# Patient Record
Sex: MALE | Race: BLACK OR AFRICAN AMERICAN | NOT HISPANIC OR LATINO | Employment: FULL TIME | URBAN - METROPOLITAN AREA
[De-identification: names, ages, dates, MRNs, and addresses within clinical notes are randomized per-mention and may not be internally consistent; named-entity substitution may affect disease eponyms.]

---

## 2017-01-09 ENCOUNTER — ANESTHESIA EVENT (OUTPATIENT)
Dept: SURGERY | Facility: HOSPITAL | Age: 47
End: 2017-01-09
Payer: COMMERCIAL

## 2017-01-09 ENCOUNTER — TELEPHONE (OUTPATIENT)
Dept: SURGERY | Facility: CLINIC | Age: 47
End: 2017-01-09

## 2017-01-09 NOTE — ANESTHESIA PREPROCEDURE EVALUATION
01/09/2017  Pre-operative evaluation for Procedure(s) (LRB):  REPAIR-HERNIA-INGUINAL Open Right with Mesh (Right)    Marina Caban is a 46 y.o. male     LDA:      Prev airway: None documented    There is no problem list on file for this patient.      Review of patient's allergies indicates:  No Known Allergies     No current facility-administered medications on file prior to encounter.      No current outpatient prescriptions on file prior to encounter.       Past Surgical History   Procedure Laterality Date    Hernia rpair  2003       Social History     Social History    Marital status:      Spouse name: N/A    Number of children: N/A    Years of education: N/A     Occupational History    Not on file.     Social History Main Topics    Smoking status: Never Smoker    Smokeless tobacco: Not on file    Alcohol use Not on file    Drug use: Not on file    Sexual activity: Not on file     Other Topics Concern    Not on file     Social History Narrative         Vital Signs Range (Last 24H):         CBC: No results for input(s): WBC, RBC, HGB, HCT, PLT, MCV, MCH, MCHC in the last 72 hours.    CMP: No results for input(s): NA, K, CL, CO2, BUN, CREATININE, GLU, MG, PHOS, CALCIUM, ALBUMIN, PROT, ALKPHOS, ALT, AST, BILITOT in the last 72 hours.    INR  No results for input(s): INR, PROTIME, APTT in the last 72 hours.    Invalid input(s): PT      [facility] Anesthesia Evaluation    I have reviewed the Patient Summary Reports.     I have reviewed the Medications.     Review of Systems  Anesthesia Hx:  No problems with previous Anesthesia Denies Hx of Anesthetic complications  History of prior surgery of interest to airway management or planning: Denies Family Hx of Anesthesia complications.   Denies Personal Hx of Anesthesia complications.   Social:  Non-Smoker, No Alcohol Use     Hematology/Oncology:  Hematology Normal        EENT/Dental:EENT/Dental Normal   Cardiovascular:  Cardiovascular Normal     Pulmonary:  Pulmonary Normal    Renal/:  Renal/ Normal     Hepatic/GI:   H/o R inguinal hernia   Musculoskeletal:  Musculoskeletal Normal    Neurological:  Neurology Normal    Endocrine:  Endocrine Normal        Physical Exam  General:  Well nourished    Airway/Jaw/Neck:  Airway Findings: Mouth Opening: Normal Tongue: Normal  General Airway Assessment: Adult  Mallampati: I  Improves to I with phonation.  TM Distance: Normal, at least 6 cm  Jaw/Neck Findings:  Neck ROM: Normal ROM     Eyes/Ears/Nose:  EYES/EARS/NOSE FINDINGS: Normal   Dental:  Dental Findings: In tact   Chest/Lungs:  Chest/Lungs Findings: Clear to auscultation, Normal Respiratory Rate     Heart/Vascular:  Heart Findings: Rate: Normal  Rhythm: Regular Rhythm        Mental Status:  Mental Status Findings:  Cooperative, Alert and Oriented         Anesthesia Plan  Type of Anesthesia, risks & benefits discussed:  Anesthesia Type:  general  Patient's Preference:   Intra-op Monitoring Plan:   Intra-op Monitoring Plan Comments:   Post Op Pain Control Plan:   Post Op Pain Control Plan Comments:   Induction:   IV  Beta Blocker:  Patient is not currently on a Beta-Blocker (No further documentation required).       Informed Consent: Patient understands risks and agrees with Anesthesia plan.  Questions answered. Anesthesia consent signed with patient.  ASA Score: 1     Day of Surgery Review of History & Physical: I have interviewed and examined the patient. I have reviewed the patient's H&P dated:    H&P update referred to the surgeon.         Ready For Surgery From Anesthesia Perspective.

## 2017-01-09 NOTE — TELEPHONE ENCOUNTER
Left message on patient's voicemail for him to arrive at [facility] tomorrow 1/10/17 at 8:30am for surgery with Dr. Perez.  Pre-Op instructions reinforced.

## 2017-01-10 ENCOUNTER — ANESTHESIA (OUTPATIENT)
Dept: SURGERY | Facility: HOSPITAL | Age: 47
End: 2017-01-10
Payer: COMMERCIAL

## 2017-01-10 PROBLEM — K40.90 RIGHT INGUINAL HERNIA: Status: ACTIVE | Noted: 2017-01-10

## 2017-01-10 PROCEDURE — 25000003 PHARM REV CODE 250: Performed by: SURGERY

## 2017-01-10 PROCEDURE — 25000003 PHARM REV CODE 250: Performed by: STUDENT IN AN ORGANIZED HEALTH CARE EDUCATION/TRAINING PROGRAM

## 2017-01-10 PROCEDURE — D9220A PRA ANESTHESIA: Mod: ,,, | Performed by: ANESTHESIOLOGY

## 2017-01-10 PROCEDURE — 63600175 PHARM REV CODE 636 W HCPCS: Performed by: STUDENT IN AN ORGANIZED HEALTH CARE EDUCATION/TRAINING PROGRAM

## 2017-01-10 RX ORDER — PROPOFOL 10 MG/ML
VIAL (ML) INTRAVENOUS
Status: DISCONTINUED | OUTPATIENT
Start: 2017-01-10 | End: 2017-01-10

## 2017-01-10 RX ORDER — ACETAMINOPHEN 10 MG/ML
INJECTION, SOLUTION INTRAVENOUS
Status: DISCONTINUED | OUTPATIENT
Start: 2017-01-10 | End: 2017-01-10

## 2017-01-10 RX ORDER — ONDANSETRON 2 MG/ML
INJECTION INTRAMUSCULAR; INTRAVENOUS
Status: DISCONTINUED | OUTPATIENT
Start: 2017-01-10 | End: 2017-01-10

## 2017-01-10 RX ORDER — SUCCINYLCHOLINE CHLORIDE 20 MG/ML
INJECTION INTRAMUSCULAR; INTRAVENOUS
Status: DISCONTINUED | OUTPATIENT
Start: 2017-01-10 | End: 2017-01-10

## 2017-01-10 RX ORDER — MIDAZOLAM HYDROCHLORIDE 1 MG/ML
INJECTION, SOLUTION INTRAMUSCULAR; INTRAVENOUS
Status: DISCONTINUED | OUTPATIENT
Start: 2017-01-10 | End: 2017-01-10

## 2017-01-10 RX ORDER — KETOROLAC TROMETHAMINE 30 MG/ML
INJECTION, SOLUTION INTRAMUSCULAR; INTRAVENOUS
Status: DISCONTINUED | OUTPATIENT
Start: 2017-01-10 | End: 2017-01-10

## 2017-01-10 RX ORDER — FENTANYL CITRATE 50 UG/ML
INJECTION, SOLUTION INTRAMUSCULAR; INTRAVENOUS
Status: DISCONTINUED | OUTPATIENT
Start: 2017-01-10 | End: 2017-01-10

## 2017-01-10 RX ORDER — LIDOCAINE HCL/PF 100 MG/5ML
SYRINGE (ML) INTRAVENOUS
Status: DISCONTINUED | OUTPATIENT
Start: 2017-01-10 | End: 2017-01-10

## 2017-01-10 RX ADMIN — SUCCINYLCHOLINE CHLORIDE 180 MG: 20 INJECTION, SOLUTION INTRAMUSCULAR; INTRAVENOUS at 09:01

## 2017-01-10 RX ADMIN — ONDANSETRON 4 MG: 2 INJECTION INTRAMUSCULAR; INTRAVENOUS at 10:01

## 2017-01-10 RX ADMIN — LIDOCAINE HYDROCHLORIDE 70 MG: 20 INJECTION, SOLUTION INTRAVENOUS at 09:01

## 2017-01-10 RX ADMIN — ACETAMINOPHEN 1000 MG: 10 INJECTION, SOLUTION INTRAVENOUS at 10:01

## 2017-01-10 RX ADMIN — KETOROLAC TROMETHAMINE 30 MG: 30 INJECTION, SOLUTION INTRAMUSCULAR; INTRAVENOUS at 10:01

## 2017-01-10 RX ADMIN — MIDAZOLAM HYDROCHLORIDE 2 MG: 1 INJECTION, SOLUTION INTRAMUSCULAR; INTRAVENOUS at 09:01

## 2017-01-10 RX ADMIN — SODIUM CHLORIDE: 0.9 INJECTION, SOLUTION INTRAVENOUS at 09:01

## 2017-01-10 RX ADMIN — FENTANYL CITRATE 100 MCG: 50 INJECTION, SOLUTION INTRAMUSCULAR; INTRAVENOUS at 09:01

## 2017-01-10 RX ADMIN — PROPOFOL 150 MG: 10 INJECTION, EMULSION INTRAVENOUS at 09:01

## 2017-01-10 RX ADMIN — Medication 2 G: at 10:01

## 2017-01-10 NOTE — TRANSFER OF CARE
Anesthesia Transfer of Care Note    Patient: Marina Caban    Procedure(s) Performed: Procedure(s) (LRB):  REPAIR-HERNIA-INGUINAL Open Right with Mesh (Right)    Patient location: PACU    Anesthesia Type: general    Transport from OR: Transported from OR on 2-3 L/min O2 by NC with adequate spontaneous ventilation    Post pain: adequate analgesia    Post assessment: no apparent anesthetic complications    Post vital signs: stable    Level of consciousness: awake and alert    Nausea/Vomiting: no nausea/vomiting    Complications: none          Last vitals:   Visit Vitals    BP (!) 157/86 (BP Location: Left arm, Patient Position: Lying, BP Method: Automatic)    Pulse 83    Temp 36.1 °C (96.9 °F) (Axillary)    Resp 16    Ht 6' (1.829 m)    Wt 80.7 kg (178 lb)    SpO2 98%    BMI 24.14 kg/m2

## 2017-01-10 NOTE — ANESTHESIA POSTPROCEDURE EVALUATION
Anesthesia Post Evaluation    Patient: Marina Caban    Procedure(s) Performed: Procedure(s) (LRB):  REPAIR-HERNIA-INGUINAL Open Right with Mesh (Right)    Final Anesthesia Type: general  Patient location during evaluation: PACU  Patient participation: Yes- Able to Participate  Level of consciousness: awake and alert  Post-procedure vital signs: reviewed and stable  Pain management: adequate  Airway patency: patent  PONV status at discharge: No PONV  Anesthetic complications: no      Cardiovascular status: blood pressure returned to baseline  Respiratory status: unassisted  Hydration status: euvolemic  Follow-up not needed.        Visit Vitals    /76    Pulse 66    Temp 36.7 °C (98 °F) (Oral)    Resp 19    Ht 6' (1.829 m)    Wt 80.7 kg (178 lb)    SpO2 100%    BMI 24.14 kg/m2       Pain/Nj Score: Pain Assessment Performed: Yes (1/10/2017 12:21 PM)  Presence of Pain: complains of pain/discomfort (1/10/2017 12:21 PM)  Pain Rating Prior to Med Admin: 9 (1/10/2017 11:41 AM)  Pain Rating Post Med Admin: 6 (1/10/2017 11:45 AM)  Nj Score: 10 (1/10/2017 12:21 PM)

## 2017-01-10 NOTE — ANESTHESIA RELEASE NOTE
Anesthesia Release from PACU Note    Patient: Marina Caban    Procedure(s) Performed: Procedure(s) (LRB):  REPAIR-HERNIA-INGUINAL Open Right with Mesh (Right)    Anesthesia type: general    Post pain: Adequate analgesia    Post assessment: no apparent anesthetic complications    Last Vitals:   Visit Vitals    /76    Pulse 66    Temp 36.7 °C (98 °F) (Oral)    Resp 19    Ht 6' (1.829 m)    Wt 80.7 kg (178 lb)    SpO2 100%    BMI 24.14 kg/m2       Post vital signs: stable    Level of consciousness: awake, alert  and oriented    Nausea/Vomiting: no nausea/no vomiting    Complications: none    Airway Patency: patent    Respiratory: unassisted    Cardiovascular: stable and blood pressure at baseline    Hydration: euvolemic

## 2017-01-23 ENCOUNTER — OFFICE VISIT (OUTPATIENT)
Dept: SURGERY | Facility: CLINIC | Age: 47
End: 2017-01-23
Payer: COMMERCIAL

## 2017-01-23 VITALS
BODY MASS INDEX: 25.06 KG/M2 | WEIGHT: 185 LBS | TEMPERATURE: 98 F | HEART RATE: 85 BPM | SYSTOLIC BLOOD PRESSURE: 133 MMHG | HEIGHT: 72 IN | DIASTOLIC BLOOD PRESSURE: 74 MMHG

## 2017-01-23 DIAGNOSIS — Z98.890 S/P INGUINAL HERNIA REPAIR USING SYNTHETIC PATCH: Primary | ICD-10-CM

## 2017-01-23 DIAGNOSIS — Z87.19 S/P INGUINAL HERNIA REPAIR USING SYNTHETIC PATCH: Primary | ICD-10-CM

## 2017-01-23 PROCEDURE — 99999 PR PBB SHADOW E&M-EST. PATIENT-LVL III: CPT | Mod: PBBFAC,,, | Performed by: SURGERY

## 2017-01-23 PROCEDURE — 99024 POSTOP FOLLOW-UP VISIT: CPT | Mod: S$GLB,,, | Performed by: SURGERY

## 2017-01-23 NOTE — PROGRESS NOTES
General Surgery Progress Note    S:  47 y/o male s/p open right inguinal hernia repair with mesh.  Pt has been doing well. No fevers / chills.  No CP / SOB.  dean diet.  Had some constipation due to narcotics, since relieved.      O:    Visit Vitals    /74 (BP Location: Right arm, Patient Position: Sitting, BP Method: Automatic)    Pulse 85    Temp 98.3 °F (36.8 °C) (Oral)    Ht 6' (1.829 m)    Wt 83.9 kg (185 lb)    BMI 25.09 kg/m2     NAD, A&O x4  Abd soft.  Right inguinal incision well healed; healing ridge noted; no erythema / exudate.  No recurrence noted    Path:  Hernia sac    A/P:  47 y/o male s/p open RIH repair with mesh    Light duty 4 more weeks.  RTC PRN.    Silvana Giordano MD  General Surgery, PGY-V  [phone]

## 2020-03-16 ENCOUNTER — OFFICE VISIT (OUTPATIENT)
Dept: URGENT CARE | Facility: CLINIC | Age: 50
End: 2020-03-16
Payer: COMMERCIAL

## 2020-03-16 VITALS
TEMPERATURE: 98 F | HEIGHT: 72 IN | WEIGHT: 190 LBS | DIASTOLIC BLOOD PRESSURE: 76 MMHG | BODY MASS INDEX: 25.73 KG/M2 | OXYGEN SATURATION: 97 % | HEART RATE: 79 BPM | SYSTOLIC BLOOD PRESSURE: 129 MMHG

## 2020-03-16 DIAGNOSIS — R05.9 COUGH: ICD-10-CM

## 2020-03-16 DIAGNOSIS — R19.7 DIARRHEA, UNSPECIFIED TYPE: ICD-10-CM

## 2020-03-16 DIAGNOSIS — K52.9 GASTROENTERITIS: Primary | ICD-10-CM

## 2020-03-16 DIAGNOSIS — R11.0 NAUSEA: ICD-10-CM

## 2020-03-16 LAB
CTP QC/QA: YES
FLUAV AG NPH QL: NEGATIVE
FLUBV AG NPH QL: NEGATIVE

## 2020-03-16 PROCEDURE — 99214 OFFICE O/P EST MOD 30 MIN: CPT | Mod: 25,S$GLB,, | Performed by: FAMILY MEDICINE

## 2020-03-16 PROCEDURE — 87804 POCT INFLUENZA A/B: ICD-10-PCS | Mod: QW,S$GLB,, | Performed by: FAMILY MEDICINE

## 2020-03-16 PROCEDURE — 87804 INFLUENZA ASSAY W/OPTIC: CPT | Mod: QW,S$GLB,, | Performed by: FAMILY MEDICINE

## 2020-03-16 PROCEDURE — 99214 PR OFFICE/OUTPT VISIT, EST, LEVL IV, 30-39 MIN: ICD-10-PCS | Mod: 25,S$GLB,, | Performed by: FAMILY MEDICINE

## 2020-03-16 RX ORDER — ONDANSETRON 4 MG/1
4 TABLET, ORALLY DISINTEGRATING ORAL EVERY 8 HOURS PRN
Qty: 20 TABLET | Refills: 0 | Status: SHIPPED | OUTPATIENT
Start: 2020-03-16 | End: 2020-03-23

## 2020-03-16 RX ORDER — DICYCLOMINE HYDROCHLORIDE 20 MG/1
20 TABLET ORAL EVERY 6 HOURS
Qty: 20 TABLET | Refills: 0 | Status: ON HOLD | OUTPATIENT
Start: 2020-03-16 | End: 2020-03-22 | Stop reason: HOSPADM

## 2020-03-16 NOTE — PROGRESS NOTES
Subjective:       Patient ID: Marina Caban is a 50 y.o. male.    Vitals:  height is 6' (1.829 m) and weight is 86.2 kg (190 lb). His oral temperature is 98.1 °F (36.7 °C). His blood pressure is 129/76 and his pulse is 79. His oxygen saturation is 97%.     Chief Complaint: Cough    Cough   This is a new problem. The current episode started in the past 7 days (4 or 5 days). The problem has been gradually worsening. The cough is non-productive. Associated symptoms include headaches. Pertinent negatives include no chest pain, chills, ear congestion, ear pain, eye redness, fever, heartburn, hemoptysis, myalgias, nasal congestion, postnasal drip, rash, rhinorrhea, sore throat, shortness of breath, sweats, weight loss or wheezing. Exacerbated by: inhale. Treatments tried: Theraflu. The treatment provided mild relief. There is no history of asthma, bronchiectasis, bronchitis, COPD, emphysema, environmental allergies or pneumonia.   pt also c/o having nausea and diarrhea for a few days.  Pt has about 3 episodes of diarrhea a day.    Constitution: Positive for appetite change and fatigue. Negative for chills, sweating and fever.   HENT: Negative for ear pain, postnasal drip, sinus pressure, sore throat and voice change.    Neck: Negative for painful lymph nodes.   Cardiovascular: Negative for chest pain.   Eyes: Negative for eye redness.   Respiratory: Positive for cough. Negative for chest tightness, sputum production, bloody sputum, COPD, shortness of breath, stridor, wheezing and asthma.    Gastrointestinal: Negative for heartburn.   Musculoskeletal: Negative for muscle ache.   Skin: Negative for rash.   Allergic/Immunologic: Negative for environmental allergies, seasonal allergies and asthma.   Neurological: Positive for headaches.   Hematologic/Lymphatic: Negative for swollen lymph nodes.       Objective:      Physical Exam   Constitutional: He is oriented to person, place, and time. He appears well-developed and  well-nourished. He is cooperative.  Non-toxic appearance. He does not appear ill. No distress.   HENT:   Head: Normocephalic and atraumatic.   Right Ear: Hearing, tympanic membrane, external ear and ear canal normal.   Left Ear: Hearing, tympanic membrane, external ear and ear canal normal.   Nose: Nose normal. No mucosal edema, rhinorrhea or nasal deformity. No epistaxis. Right sinus exhibits no maxillary sinus tenderness and no frontal sinus tenderness. Left sinus exhibits no maxillary sinus tenderness and no frontal sinus tenderness.   Mouth/Throat: Uvula is midline, oropharynx is clear and moist and mucous membranes are normal. No trismus in the jaw. Normal dentition. No uvula swelling. No posterior oropharyngeal erythema.   Eyes: Conjunctivae and lids are normal. Right eye exhibits no discharge. Left eye exhibits no discharge. No scleral icterus.   Neck: Trachea normal, normal range of motion, full passive range of motion without pain and phonation normal. Neck supple.   Cardiovascular: Normal rate, regular rhythm, normal heart sounds, intact distal pulses and normal pulses.   Pulmonary/Chest: Effort normal and breath sounds normal. No respiratory distress. He has no wheezes.   Abdominal: Soft. Normal appearance and bowel sounds are normal. He exhibits no distension and no pulsatile midline mass. There is no tenderness. There is no rebound and no guarding.   Musculoskeletal: Normal range of motion. He exhibits no edema or deformity.   Lymphadenopathy:     He has no cervical adenopathy.   Neurological: He is alert and oriented to person, place, and time. He exhibits normal muscle tone. Coordination normal.   Skin: Skin is warm, dry, intact, not diaphoretic and not pale.   Psychiatric: He has a normal mood and affect. His speech is normal and behavior is normal. Judgment and thought content normal. Cognition and memory are normal.   Nursing note and vitals reviewed.        Assessment:       1. Gastroenteritis     2. Cough    3. Diarrhea, unspecified type    4. Nausea        Plan:         Gastroenteritis    Cough  -     POCT Influenza A/B    Diarrhea, unspecified type  -     dicyclomine (BENTYL) 20 mg tablet; Take 1 tablet (20 mg total) by mouth every 6 (six) hours. for 5 days  Dispense: 20 tablet; Refill: 0    Nausea  -     ondansetron (ZOFRAN-ODT) 4 MG TbDL; Take 1 tablet (4 mg total) by mouth every 8 (eight) hours as needed (nausea/vomit).  Dispense: 20 tablet; Refill: 0          Patient Instructions     Uncertain Causes of Diarrhea (Adult)    Diarrhea is when stools are loose and watery. This can be caused by:  · Viral infections  · Bacterial infections  · Food poisoning  · Parasites  · Irritable bowel syndrome (IBS)  · Inflammatory bowel diseases such as ulcerative colitis, Crohn's disease, and celiac disease  · Food intolerance, such as to lactose, the sugar found in milk and milk products  · Reaction to medicines like antibiotics, laxatives, cancer drugs, and antacids  Along with diarrhea, you may also have:  · Abdominal pain and cramping  · Nausea and vomiting  · Loss of bowel control  · Fever and chills  · Bloody stools  In some cases, antibiotics may help to treat diarrhea. You may have a stool sample test. This is done to see what is causing your diarrhea, and if antibiotics will help treat it. The results of a stool sample test may take up to 2 days. The healthcare provider may not give you antibiotics until he or she has the stool test results.  Diarrhea can cause dehydration. This is the loss of too much water and other fluids from the body. When this occurs, body fluid must be replaced. This can be done with oral rehydration solutions. Oral rehydration solutions are available at drugstores and grocery stores without a prescription.  Home care  Follow all instructions given by your healthcare provider. Rest at home for the next 24 hours, or until you feel better. Avoid caffeine, tobacco, and alcohol. These can  make diarrhea, cramping, and pain worse.  If taking medicines:  · Dont take over-the-counter diarrhea or nausea medicines unless your healthcare provider tells you to.  · You may use acetaminophen or NSAID medicines like ibuprofen or naproxen to reduce pain and fever. Dont use these if you have chronic liver or kidney disease, or ever had a stomach ulcer or gastrointestinal bleeding. Don't use NSAID medicines if you are already taking one for another condition (like arthritis) or are on daily aspirin therapy (such as for heart disease or after a stroke). Talk with your healthcare provider first.  · If antibiotics were prescribed, be sure you take them until they are finished. Dont stop taking them even when you feel better. Antibiotics must be taken as a full course.  To prevent the spread of illness:  · Remember that washing with soap and water and using alcohol-based  is the best way to prevent the spread of infection.  · Clean the toilet after each use.  · Wash your hands before eating.  · Wash your hands before and after preparing food. Keep in mind that people with diarrhea or vomiting should not prepare food for others.  · Wash your hands after using cutting boards, countertops, and knives that have been in contact with raw foods.  · Wash and then peel fruits and vegetables.  · Keep uncooked meats away from cooked and ready-to-eat foods.  · Use a food thermometer when cooking. Cook poultry to at least 165°F (74°C). Cook ground meat (beef, veal, pork, lamb) to at least 160°F (71°C). Cook fresh beef, veal, lamb, and pork to at least 145°F (63°C).  · Dont eat raw or undercooked eggs (poached or jessie side up), poultry, meat, or unpasteurized milk and juices.  Food and drinks  The main goal while treating vomiting or diarrhea is to prevent dehydration. This is done by taking small amounts of liquids often.  · Keep in mind that liquids are more important than food right now.  · Drink only small amounts  of liquids at a time.  · Dont force yourself to eat, especially if you are having cramping, vomiting, or diarrhea. Dont eat large amounts at a time, even if you are hungry.  · If you eat, avoid fatty, greasy, spicy, or fried foods.  · Dont eat dairy foods or drink milk if you have diarrhea. These can make diarrhea worse.  During the first 24 hours you can try:  · Oral rehydration solutions. Do not use sports drinks. They have too much sugar and not enough electrolytes.  · Soft drinks without caffeine  · Ginger ale  · Water (plain or flavored)  · Decaf tea or coffee  · Clear broth, consommé, or bouillon  · Gelatin, popsicles, or frozen fruit juice bars  The second 24 hours, if you are feeling better, you can add:  · Hot cereal, plain toast, bread, rolls, or crackers  · Plain noodles, rice, mashed potatoes, chicken noodle soup, or rice soup  · Unsweetened canned fruit (no pineapple)  · Bananas  As you recover:  · Limit fat intake to less than 15 grams per day. Dont eat margarine, butter, oils, mayonnaise, sauces, gravies, fried foods, peanut butter, meat, poultry, or fish.  · Limit fiber. Dont eat raw or cooked vegetables, fresh fruits except bananas, or bran cereals.  · Limit caffeine and chocolate.  · Limit dairy.  · Dont use spices or seasonings except salt.  · Go back to your normal diet over time, as you feel better and your symptoms improve.  · If the symptoms come back, go back to a simple diet or clear liquids.  Follow-up care  Follow up with your healthcare provider, or as advised. If a stool sample was taken or cultures were done, call the healthcare provider for the results as instructed.  Call 911  Call 911 if you have any of these symptoms:  · Trouble breathing  · Confusion  · Extreme drowsiness or trouble walking  · Loss of consciousness  · Rapid heart rate  · Chest pain  · Stiff neck  · Seizure  When to seek medical advice  Call your healthcare provider right away if any of these  occur:  · Abdominal pain that gets worse  · Constant lower right abdominal pain  · Continued vomiting and inability to keep liquids down  · Diarrhea more than 5 times a day  · Blood in vomit or stool  · Dark urine or no urine for 8 hours, dry mouth and tongue, tiredness, weakness, or dizziness  · Drowsiness  · New rash  · You dont get better in 2 to 3 days  · Fever of 100.4°F (38°C) or higher that doesnt get lower with medicine  Date Last Reviewed: 1/3/2016  © 7663-8104 [employer] [address] [place] [address]. All rights reserved. This information is not intended as a substitute for professional medical care. Always follow your healthcare professional's instructions.      Follow up with your doctor in a few days as needed.  Return to the urgent care or go to the ER if symptoms get worse.    Giovanny Bauman MD

## 2020-03-16 NOTE — PATIENT INSTRUCTIONS

## 2020-03-16 NOTE — LETTER
March 16, 2020      [facility]  [address] [place] [address]  Phone: [phone]  Fax: [phone]       Patient: Marina Caban   YOB: 1970  Date of Visit: 03/16/2020    To Whom It May Concern:    Sabrina Caban  was at [facility] on 03/16/2020. He may return to work/school on 3/18/20 with no restrictions. If you have any questions or concerns, or if I can be of further assistance, please do not hesitate to contact me.    Sincerely,    Giovanny Bauman MD

## 2020-03-17 ENCOUNTER — HOSPITAL ENCOUNTER (INPATIENT)
Facility: HOSPITAL | Age: 50
LOS: 5 days | Discharge: HOME | DRG: 189 | End: 2020-03-22
Attending: EMERGENCY MEDICINE | Admitting: EMERGENCY MEDICINE
Payer: COMMERCIAL

## 2020-03-17 DIAGNOSIS — E87.1 HYPONATREMIA: ICD-10-CM

## 2020-03-17 DIAGNOSIS — Z20.822 SUSPECTED COVID-19 VIRUS INFECTION: ICD-10-CM

## 2020-03-17 DIAGNOSIS — R09.02 HYPOXIA: Primary | ICD-10-CM

## 2020-03-17 DIAGNOSIS — R06.02 SHORTNESS OF BREATH: ICD-10-CM

## 2020-03-17 DIAGNOSIS — R74.8 ABNORMAL LIVER ENZYMES: ICD-10-CM

## 2020-03-17 LAB
ALBUMIN SERPL BCP-MCNC: 3.5 G/DL (ref 3.5–5.2)
ALP SERPL-CCNC: 50 U/L (ref 55–135)
ALT SERPL W/O P-5'-P-CCNC: 50 U/L (ref 10–44)
ANION GAP SERPL CALC-SCNC: 9 MMOL/L (ref 8–16)
AST SERPL-CCNC: 65 U/L (ref 10–40)
BASOPHILS # BLD AUTO: ABNORMAL K/UL (ref 0–0.2)
BASOPHILS NFR BLD: 0 % (ref 0–1.9)
BILIRUB SERPL-MCNC: 0.6 MG/DL (ref 0.1–1)
BUN SERPL-MCNC: 14 MG/DL (ref 6–20)
CALCIUM SERPL-MCNC: 8.4 MG/DL (ref 8.7–10.5)
CHLORIDE SERPL-SCNC: 99 MMOL/L (ref 95–110)
CO2 SERPL-SCNC: 24 MMOL/L (ref 23–29)
CREAT SERPL-MCNC: 1.3 MG/DL (ref 0.5–1.4)
CRP SERPL-MCNC: 27.4 MG/L (ref 0–8.2)
DIFFERENTIAL METHOD: ABNORMAL
EOSINOPHIL # BLD AUTO: ABNORMAL K/UL (ref 0–0.5)
EOSINOPHIL NFR BLD: 0 % (ref 0–8)
ERYTHROCYTE [DISTWIDTH] IN BLOOD BY AUTOMATED COUNT: 12.5 % (ref 11.5–14.5)
EST. GFR  (AFRICAN AMERICAN): >60 ML/MIN/1.73 M^2
EST. GFR  (NON AFRICAN AMERICAN): >60 ML/MIN/1.73 M^2
GLUCOSE SERPL-MCNC: 100 MG/DL (ref 70–110)
HCT VFR BLD AUTO: 47.3 % (ref 40–54)
HGB BLD-MCNC: 15.6 G/DL (ref 14–18)
IMM GRANULOCYTES # BLD AUTO: ABNORMAL K/UL (ref 0–0.04)
IMM GRANULOCYTES NFR BLD AUTO: ABNORMAL % (ref 0–0.5)
LACTATE SERPL-SCNC: 1 MMOL/L (ref 0.5–2.2)
LYMPHOCYTES # BLD AUTO: ABNORMAL K/UL (ref 1–4.8)
LYMPHOCYTES NFR BLD: 38 % (ref 18–48)
MCH RBC QN AUTO: 31.2 PG (ref 27–31)
MCHC RBC AUTO-ENTMCNC: 33 G/DL (ref 32–36)
MCV RBC AUTO: 95 FL (ref 82–98)
MONOCYTES # BLD AUTO: ABNORMAL K/UL (ref 0.3–1)
MONOCYTES NFR BLD: 8 % (ref 4–15)
NEUTROPHILS NFR BLD: 54 % (ref 38–73)
NRBC BLD-RTO: 0 /100 WBC
PLATELET # BLD AUTO: 102 K/UL (ref 150–350)
PLATELET BLD QL SMEAR: ABNORMAL
PMV BLD AUTO: 10.1 FL (ref 9.2–12.9)
POTASSIUM SERPL-SCNC: 4.3 MMOL/L (ref 3.5–5.1)
PROCALCITONIN SERPL IA-MCNC: 0.1 NG/ML
PROT SERPL-MCNC: 7 G/DL (ref 6–8.4)
RBC # BLD AUTO: 5 M/UL (ref 4.6–6.2)
SODIUM SERPL-SCNC: 132 MMOL/L (ref 136–145)
WBC # BLD AUTO: 2.2 K/UL (ref 3.9–12.7)

## 2020-03-17 PROCEDURE — 85007 BL SMEAR W/DIFF WBC COUNT: CPT

## 2020-03-17 PROCEDURE — 99285 EMERGENCY DEPT VISIT HI MDM: CPT | Mod: ,,, | Performed by: EMERGENCY MEDICINE

## 2020-03-17 PROCEDURE — 86140 C-REACTIVE PROTEIN: CPT

## 2020-03-17 PROCEDURE — 99900035 HC TECH TIME PER 15 MIN (STAT)

## 2020-03-17 PROCEDURE — 99285 PR EMERGENCY DEPT VISIT,LEVEL V: ICD-10-PCS | Mod: ,,, | Performed by: EMERGENCY MEDICINE

## 2020-03-17 PROCEDURE — 93010 ELECTROCARDIOGRAM REPORT: CPT | Mod: ,,, | Performed by: INTERNAL MEDICINE

## 2020-03-17 PROCEDURE — 93005 ELECTROCARDIOGRAM TRACING: CPT

## 2020-03-17 PROCEDURE — 27100098 HC SPACER

## 2020-03-17 PROCEDURE — 83605 ASSAY OF LACTIC ACID: CPT

## 2020-03-17 PROCEDURE — 94761 N-INVAS EAR/PLS OXIMETRY MLT: CPT

## 2020-03-17 PROCEDURE — 99285 EMERGENCY DEPT VISIT HI MDM: CPT | Mod: 25

## 2020-03-17 PROCEDURE — 85027 COMPLETE CBC AUTOMATED: CPT

## 2020-03-17 PROCEDURE — 87040 BLOOD CULTURE FOR BACTERIA: CPT | Mod: 59

## 2020-03-17 PROCEDURE — 80053 COMPREHEN METABOLIC PANEL: CPT

## 2020-03-17 PROCEDURE — 12000002 HC ACUTE/MED SURGE SEMI-PRIVATE ROOM

## 2020-03-17 PROCEDURE — 84145 PROCALCITONIN (PCT): CPT

## 2020-03-17 PROCEDURE — 27000221 HC OXYGEN, UP TO 24 HOURS

## 2020-03-17 PROCEDURE — U0002 COVID-19 LAB TEST NON-CDC: HCPCS

## 2020-03-17 PROCEDURE — 94640 AIRWAY INHALATION TREATMENT: CPT

## 2020-03-17 PROCEDURE — 93010 EKG 12-LEAD: ICD-10-PCS | Mod: ,,, | Performed by: INTERNAL MEDICINE

## 2020-03-17 PROCEDURE — 63600175 PHARM REV CODE 636 W HCPCS: Performed by: HOSPITALIST

## 2020-03-17 PROCEDURE — 25000242 PHARM REV CODE 250 ALT 637 W/ HCPCS: Performed by: PHYSICIAN ASSISTANT

## 2020-03-17 RX ORDER — ALBUTEROL SULFATE 90 UG/1
2 AEROSOL, METERED RESPIRATORY (INHALATION)
Status: COMPLETED | OUTPATIENT
Start: 2020-03-17 | End: 2020-03-17

## 2020-03-17 RX ORDER — ENOXAPARIN SODIUM 100 MG/ML
40 INJECTION SUBCUTANEOUS EVERY 24 HOURS
Status: DISCONTINUED | OUTPATIENT
Start: 2020-03-18 | End: 2020-03-22 | Stop reason: HOSPADM

## 2020-03-17 RX ORDER — CEFTRIAXONE 1 G/1
1 INJECTION, POWDER, FOR SOLUTION INTRAMUSCULAR; INTRAVENOUS
Status: COMPLETED | OUTPATIENT
Start: 2020-03-17 | End: 2020-03-17

## 2020-03-17 RX ORDER — SODIUM CHLORIDE 0.9 % (FLUSH) 0.9 %
10 SYRINGE (ML) INJECTION
Status: DISCONTINUED | OUTPATIENT
Start: 2020-03-17 | End: 2020-03-22 | Stop reason: HOSPADM

## 2020-03-17 RX ORDER — TALC
6 POWDER (GRAM) TOPICAL NIGHTLY PRN
Status: DISCONTINUED | OUTPATIENT
Start: 2020-03-17 | End: 2020-03-22 | Stop reason: HOSPADM

## 2020-03-17 RX ORDER — PROCHLORPERAZINE EDISYLATE 5 MG/ML
5 INJECTION INTRAMUSCULAR; INTRAVENOUS EVERY 6 HOURS PRN
Status: DISCONTINUED | OUTPATIENT
Start: 2020-03-17 | End: 2020-03-22 | Stop reason: HOSPADM

## 2020-03-17 RX ORDER — ONDANSETRON 2 MG/ML
4 INJECTION INTRAMUSCULAR; INTRAVENOUS EVERY 8 HOURS PRN
Status: DISCONTINUED | OUTPATIENT
Start: 2020-03-17 | End: 2020-03-22 | Stop reason: HOSPADM

## 2020-03-17 RX ORDER — IBUPROFEN 200 MG
16 TABLET ORAL
Status: DISCONTINUED | OUTPATIENT
Start: 2020-03-17 | End: 2020-03-22 | Stop reason: HOSPADM

## 2020-03-17 RX ORDER — ACETAMINOPHEN 325 MG/1
650 TABLET ORAL EVERY 4 HOURS PRN
Status: DISCONTINUED | OUTPATIENT
Start: 2020-03-17 | End: 2020-03-22 | Stop reason: HOSPADM

## 2020-03-17 RX ORDER — ALBUTEROL SULFATE 90 UG/1
2 AEROSOL, METERED RESPIRATORY (INHALATION) EVERY 6 HOURS PRN
Status: DISCONTINUED | OUTPATIENT
Start: 2020-03-17 | End: 2020-03-22 | Stop reason: HOSPADM

## 2020-03-17 RX ORDER — IBUPROFEN 200 MG
24 TABLET ORAL
Status: DISCONTINUED | OUTPATIENT
Start: 2020-03-17 | End: 2020-03-22 | Stop reason: HOSPADM

## 2020-03-17 RX ADMIN — ALBUTEROL SULFATE 2 PUFF: 90 AEROSOL, METERED RESPIRATORY (INHALATION) at 04:03

## 2020-03-17 RX ADMIN — CEFTRIAXONE SODIUM 1 G: 1 INJECTION, POWDER, FOR SOLUTION INTRAMUSCULAR; INTRAVENOUS at 06:03

## 2020-03-17 RX ADMIN — AZITHROMYCIN DIHYDRATE 500 MG: 500 INJECTION, POWDER, LYOPHILIZED, FOR SOLUTION INTRAVENOUS at 06:03

## 2020-03-17 NOTE — PLAN OF CARE
Full H&P to follow. Pt. To be admitted to dedicated Hospital Medicine COVID-19 patients under investigation team.    Briefly, the patient is a 50 yr old M with no significant PMHx who presents with non-productive cough x 4 days. The patient initially presented to an urgent care yesterday, but at that time he did not report SOB, myalgias, or fevers. He reports developing the cough initially, then some nausea and diarrhea. Today, he reports also developing subjective fevers as well as TELLES. He states that his boss has tested positive for COVID, and he has had direct exposure with his boss on a daily basis.    PUI for COVID-19  - CXR with bandlike increased attenuation projected over the left lower lung zone and right lower lung zone, leukopenia noted on CBC, s/s of COVID-19, and direct contact with positive COVID case  - will continue abx, azithro/rocephin  - flu negative yesterday  - COVID testing sent out by ED  - Blood cx drawn  - continue oxygen  - isolation tray  - telemetry  - contact/droplet isolation  - limit patient interaction with outside visitors

## 2020-03-17 NOTE — ED PROVIDER NOTES
Encounter Date: 3/17/2020       History     Chief Complaint   Patient presents with    Cough     cough, SOB,fever onset 4 days ago     50-year-old male with no applicable past medical history presents for shortness of breath and fever.  Endorses dry cough starting 4 days ago for which he was taking Mucinex with some improvement.  He started to feel short of breath yesterday which is worse with exertion.  He reports associated subjective fever, myalgias, nausea and diarrhea.  He denies chest pain, abdominal pain, vomiting, urinary symptoms, sinus congestion, leg swelling, sore throat or other symptoms.  His boss was recently confirmed to have COVID 19.  He denies any other known sick contacts.  Denies any recent domestic or international travel.  Denies any recent immobilization or surgery.        Review of patient's allergies indicates:  No Known Allergies  History reviewed. No pertinent past medical history.  Past Surgical History:   Procedure Laterality Date    hernia rpair  2003     Family History   Problem Relation Age of Onset    Emphysema Father     Heart disease Father         chf     Social History     Tobacco Use    Smoking status: Never Smoker   Substance Use Topics    Alcohol use: No    Drug use: Not on file     Review of Systems   Constitutional: Positive for chills, fatigue and fever. Negative for diaphoresis.   HENT: Negative for sore throat.    Respiratory: Positive for cough and shortness of breath.    Cardiovascular: Negative for chest pain, palpitations and leg swelling.   Gastrointestinal: Positive for diarrhea and nausea. Negative for abdominal pain and vomiting.   Genitourinary: Negative for dysuria.   Musculoskeletal: Positive for myalgias. Negative for back pain.   Skin: Negative for rash.   Allergic/Immunologic: Negative for immunocompromised state.   Neurological: Negative for weakness and headaches.   Hematological: Does not bruise/bleed easily.       Physical Exam     Initial Vitals    BP Pulse Resp Temp SpO2   03/17/20 1448 03/17/20 1431 03/17/20 1431 03/17/20 1431 03/17/20 1431   127/72 91 (!) 24 100.2 °F (37.9 °C) (!) 93 %      MAP       --                Physical Exam    Nursing note and vitals reviewed.  Constitutional: He appears well-developed and well-nourished. He is not diaphoretic. He appears distressed.   HENT:   Head: Normocephalic and atraumatic.   Eyes: EOM are normal. Pupils are equal, round, and reactive to light.   Neck: Normal range of motion. Neck supple.   Cardiovascular: Normal rate, regular rhythm, normal heart sounds and intact distal pulses. Exam reveals no gallop and no friction rub.    No murmur heard.  Pulmonary/Chest: Tachypnea noted. No respiratory distress. He has decreased breath sounds in the right lower field and the left lower field. He has no wheezes. He has no rales. He exhibits no tenderness.   Patient tachypneic with increased work breathing.  Able speak in 2 word sentences   Abdominal: Soft. Bowel sounds are normal. He exhibits no distension and no mass. There is no tenderness. There is no rebound and no guarding.   Musculoskeletal: Normal range of motion.   Neurological: He is alert and oriented to person, place, and time.   Skin: Skin is warm and dry.   Psychiatric: He has a normal mood and affect.         ED Course   Procedures  Labs Reviewed   CBC W/ AUTO DIFFERENTIAL - Abnormal; Notable for the following components:       Result Value    WBC 2.20 (*)     Mean Corpuscular Hemoglobin 31.2 (*)     Platelets 102 (*)     Platelet Estimate Decreased (*)     All other components within normal limits   COMPREHENSIVE METABOLIC PANEL - Abnormal; Notable for the following components:    Sodium 132 (*)     Calcium 8.4 (*)     Alkaline Phosphatase 50 (*)     AST 65 (*)     ALT 50 (*)     All other components within normal limits   C-REACTIVE PROTEIN - Abnormal; Notable for the following components:    CRP 27.4 (*)     All other components within normal limits    CULTURE, BLOOD   CULTURE, BLOOD   PROCALCITONIN   LACTIC ACID, PLASMA   SARS-COV-2 (COVID-19) QUALITATIVE PCR          Imaging Results          X-Ray Chest AP Portable (Final result)  Result time 03/17/20 15:57:37    Final result by Dav Ulloa MD (03/17/20 15:57:37)                 Impression:      1. Bandlike increased attenuation projected over the bilateral lower lung zones, left greater than right, may be on the basis of shallow inspiratory effort/atelectasis however developing consolidation is not excluded.  Consider PA and lateral for further evaluation as warranted.      Electronically signed by: Dav Ulloa MD  Date:    03/17/2020  Time:    15:57             Narrative:    EXAMINATION:  XR CHEST AP PORTABLE    CLINICAL HISTORY:  cough;    TECHNIQUE:  Single frontal view of the chest was performed.    COMPARISON:  None    FINDINGS:  The cardiomediastinal silhouette is not enlarged.  There is no pleural effusion.  The trachea is midline.  The lungs are symmetrically expanded bilaterally with mildly coarse interstitial attenuation.  There is bandlike increased attenuation projected over the left lower lung zone and right lower lung zone.  No large focal consolidation seen.  There is no pneumothorax.  The osseous structures are remarkable for degenerative changes..                                 Medical Decision Making:   History:   Old Medical Records: I decided to obtain old medical records.  Old Records Summarized: records from clinic visits.       <> Summary of Records: Patient seen in urgent care clinic yesterday for cough with diarrhea.  That time he was afebrile with an O2 saturation 97% on room air.  Influenza test was negative yesterday.  Initial Assessment:   50-year-old male presents for shortness of breath after several days of dry cough and diarrhea.  In the ED, he is mildly hypoxic with initial O2 saturation of 93% on room air.  He desaturates down to the upper 80s with talking.  He  appears tachypneic increased work of breathing but is not using accessory muscles.  Temperature is elevated 100.2° F but he is afebrile.  Otherwise vitals normal.  Differential Diagnosis:   COVID 19 Pneumonia  Bacterial pneumonia  Influenza test was you can yesterday, therefore I feel this is unlikely  I considered pulmonary embolism but I feel this is less likely given other infection of no specific risk factors for pulmonary embolism  Independently Interpreted Test(s):   I have ordered and independently interpreted X-rays - see summary below.       <> Summary of X-Ray Reading(s): No focal consolidation or pulmonary edema  Clinical Tests:   Lab Tests: Ordered and Reviewed  Radiological Study: Ordered and Reviewed  Medical Tests: Ordered  ED Management:  Will check labs, placed on oxygen by nasal cannula, do chest x-ray, swab for COVID 19 and reassess.    Labs are notable for leukopenia WBC count 2.20.  Lymphocyte count was canceled by lab.  Mild thrombocytopenia with platelet count of 102 K. Chest x-ray without an obvious consolidation.  Procalcitonin is normal, CRP mildly elevated.  Patient satting at 98% on 2 L per minute by nasal cannula.  Given his presenting hypoxia with strong suspicion for pneumonia, patient will be admitted to Hospital Medicine for further management.  Patient comfortable with admission.  I discussed this patient with my supervising physician.                       ED Course as of Mar 17 1730   Tue Mar 17, 2020   1708 Lactic acid normal   Lactic acid, plasma [CC]   1708 Leukopenia with WBC count of 2.20   CBC auto differential(!) [CC]   1730 Procalcitonin 0.10   Procalcitonin [CC]      ED Course User Index  [CC] Erica Oconnor PA-C                Clinical Impression:       ICD-10-CM ICD-9-CM   1. Hypoxia R09.02 799.02   2. Shortness of breath R06.02 786.05         Disposition:   Disposition: Admitted  Condition: Fair     ED Disposition Condition    Admit                            Erica Oconnor PA-C  03/17/20 1740

## 2020-03-18 PROBLEM — J96.01 ACUTE RESPIRATORY FAILURE WITH HYPOXIA: Status: ACTIVE | Noted: 2020-03-18

## 2020-03-18 LAB
ALBUMIN SERPL BCP-MCNC: 3.4 G/DL (ref 3.5–5.2)
ALP SERPL-CCNC: 51 U/L (ref 55–135)
ALT SERPL W/O P-5'-P-CCNC: 53 U/L (ref 10–44)
ANION GAP SERPL CALC-SCNC: 10 MMOL/L (ref 8–16)
AST SERPL-CCNC: 68 U/L (ref 10–40)
BASOPHILS # BLD AUTO: 0.01 K/UL (ref 0–0.2)
BASOPHILS NFR BLD: 0.4 % (ref 0–1.9)
BILIRUB SERPL-MCNC: 0.6 MG/DL (ref 0.1–1)
BNP SERPL-MCNC: 10 PG/ML (ref 0–99)
BUN SERPL-MCNC: 15 MG/DL (ref 6–20)
CALCIUM SERPL-MCNC: 8.3 MG/DL (ref 8.7–10.5)
CHLORIDE SERPL-SCNC: 97 MMOL/L (ref 95–110)
CO2 SERPL-SCNC: 24 MMOL/L (ref 23–29)
CREAT SERPL-MCNC: 1.4 MG/DL (ref 0.5–1.4)
DIFFERENTIAL METHOD: ABNORMAL
EOSINOPHIL # BLD AUTO: 0 K/UL (ref 0–0.5)
EOSINOPHIL NFR BLD: 0 % (ref 0–8)
ERYTHROCYTE [DISTWIDTH] IN BLOOD BY AUTOMATED COUNT: 12.6 % (ref 11.5–14.5)
ERYTHROCYTE [SEDIMENTATION RATE] IN BLOOD BY WESTERGREN METHOD: 27 MM/HR (ref 0–23)
EST. GFR  (AFRICAN AMERICAN): >60 ML/MIN/1.73 M^2
EST. GFR  (NON AFRICAN AMERICAN): 58.2 ML/MIN/1.73 M^2
FERRITIN SERPL-MCNC: 843 NG/ML (ref 20–300)
GLUCOSE SERPL-MCNC: 97 MG/DL (ref 70–110)
HCT VFR BLD AUTO: 47.2 % (ref 40–54)
HGB BLD-MCNC: 15.4 G/DL (ref 14–18)
IMM GRANULOCYTES # BLD AUTO: 0.01 K/UL (ref 0–0.04)
IMM GRANULOCYTES NFR BLD AUTO: 0.4 % (ref 0–0.5)
LDH SERPL L TO P-CCNC: 414 U/L (ref 110–260)
LYMPHOCYTES # BLD AUTO: 1.1 K/UL (ref 1–4.8)
LYMPHOCYTES NFR BLD: 40.7 % (ref 18–48)
MCH RBC QN AUTO: 30.9 PG (ref 27–31)
MCHC RBC AUTO-ENTMCNC: 32.6 G/DL (ref 32–36)
MCV RBC AUTO: 95 FL (ref 82–98)
MONOCYTES # BLD AUTO: 0.3 K/UL (ref 0.3–1)
MONOCYTES NFR BLD: 11.2 % (ref 4–15)
NEUTROPHILS # BLD AUTO: 1.3 K/UL (ref 1.8–7.7)
NEUTROPHILS NFR BLD: 47.3 % (ref 38–73)
NRBC BLD-RTO: 0 /100 WBC
PLATELET # BLD AUTO: 112 K/UL (ref 150–350)
PMV BLD AUTO: 10.2 FL (ref 9.2–12.9)
POTASSIUM SERPL-SCNC: 4.4 MMOL/L (ref 3.5–5.1)
PROT SERPL-MCNC: 7.1 G/DL (ref 6–8.4)
RBC # BLD AUTO: 4.98 M/UL (ref 4.6–6.2)
SODIUM SERPL-SCNC: 131 MMOL/L (ref 136–145)
WBC # BLD AUTO: 2.68 K/UL (ref 3.9–12.7)

## 2020-03-18 PROCEDURE — 36415 COLL VENOUS BLD VENIPUNCTURE: CPT

## 2020-03-18 PROCEDURE — 83880 ASSAY OF NATRIURETIC PEPTIDE: CPT

## 2020-03-18 PROCEDURE — 85025 COMPLETE CBC W/AUTO DIFF WBC: CPT

## 2020-03-18 PROCEDURE — 25000003 PHARM REV CODE 250: Performed by: HOSPITALIST

## 2020-03-18 PROCEDURE — 25000242 PHARM REV CODE 250 ALT 637 W/ HCPCS: Performed by: HOSPITALIST

## 2020-03-18 PROCEDURE — 63600175 PHARM REV CODE 636 W HCPCS: Performed by: HOSPITALIST

## 2020-03-18 PROCEDURE — 82728 ASSAY OF FERRITIN: CPT

## 2020-03-18 PROCEDURE — 83615 LACTATE (LD) (LDH) ENZYME: CPT

## 2020-03-18 PROCEDURE — 85652 RBC SED RATE AUTOMATED: CPT

## 2020-03-18 PROCEDURE — 99223 PR INITIAL HOSPITAL CARE,LEVL III: ICD-10-PCS | Mod: ,,, | Performed by: INTERNAL MEDICINE

## 2020-03-18 PROCEDURE — 63600175 PHARM REV CODE 636 W HCPCS: Performed by: INTERNAL MEDICINE

## 2020-03-18 PROCEDURE — 94761 N-INVAS EAR/PLS OXIMETRY MLT: CPT

## 2020-03-18 PROCEDURE — 11000001 HC ACUTE MED/SURG PRIVATE ROOM

## 2020-03-18 PROCEDURE — 99223 1ST HOSP IP/OBS HIGH 75: CPT | Mod: ,,, | Performed by: INTERNAL MEDICINE

## 2020-03-18 PROCEDURE — 80053 COMPREHEN METABOLIC PANEL: CPT

## 2020-03-18 RX ORDER — CEFTRIAXONE 1 G/1
1 INJECTION, POWDER, FOR SOLUTION INTRAMUSCULAR; INTRAVENOUS
Status: COMPLETED | OUTPATIENT
Start: 2020-03-18 | End: 2020-03-21

## 2020-03-18 RX ADMIN — ENOXAPARIN SODIUM 40 MG: 100 INJECTION SUBCUTANEOUS at 04:03

## 2020-03-18 RX ADMIN — CEFTRIAXONE SODIUM 1 G: 1 INJECTION, POWDER, FOR SOLUTION INTRAMUSCULAR; INTRAVENOUS at 08:03

## 2020-03-18 RX ADMIN — ACETAMINOPHEN 650 MG: 325 TABLET ORAL at 08:03

## 2020-03-18 RX ADMIN — ALBUTEROL SULFATE 2 PUFF: 90 AEROSOL, METERED RESPIRATORY (INHALATION) at 02:03

## 2020-03-18 RX ADMIN — ACETAMINOPHEN 650 MG: 325 TABLET ORAL at 01:03

## 2020-03-18 RX ADMIN — AZITHROMYCIN DIHYDRATE 500 MG: 500 INJECTION, POWDER, LYOPHILIZED, FOR SOLUTION INTRAVENOUS at 08:03

## 2020-03-18 NOTE — PLAN OF CARE
Pt able to make needs known, weaned down to 2L NC, meds administered as ordered, temp of 101.1 Tylenol administered as ordered, no distress noted, telemetry leads on and intact, will continue to monitor.

## 2020-03-18 NOTE — H&P
Hospital Medicine  History and Physical  [facility]      Patient Name: Marina Caban  MRN:  [id number]  Hospital Medicine Team: [facility] A Nguyễn Lambert MD  Date of Admission:  3/17/2020     Length of Stay:  LOS: 1 day     Principal Problem:  Suspected Covid-19 Virus Infection    Chief complaint    Fever    HPI    Mr. Caban is a 50-year-old man with no medical history. He was in his usual state of health until one week ago when developed a dry cough. Shortly after he became dyspneic, worst with cough and exertion. He's been intermittently febrile, for which OTC remedies have been unsuccessful.     He works at [employer] and his boss has been confirmed positive for COVID. Recently they have worked in close proximity.    No CP or GI symptoms. On examination he initially required 4L of O2 to maintain SpO2 in 90s but was quickly weaned to 2. He has most of the ancillary lab manifestations of COVID19.    On admission we discussed the trajectory of decompensating COVID patients, including mechanical ventilation, with which he was in agreement. CXR with band-like increased attenuation bilaterally.    Review of Systems    Constitutional: +F/C/NS  HENT: Negative for sore throat, trouble swallowing.    Eyes: Negative for photophobia, visual disturbance.   Respiratory: +cough, SOB   Cardiovascular: Negative for chest pain, palpitations, leg swelling.   Gastrointestinal: Negative for abdominal pain, constipation, diarrhea, nausea, vomiting.   Endocrine: Negative for cold intolerance, heat intolerance.   Genitourinary: Negative for dysuria, frequency.   Musculoskeletal: Negative for arthralgias, myalgias.   Skin: Negative for rash, wound, erythema   Neurological: Negative for dizziness, syncope, weakness, light-headedness.   Psychiatric/Behavioral: Negative for confusion, hallucinations, anxiety    History reviewed. No pertinent past medical history.  Past Surgical History:   Procedure  Laterality Date    hernia rpair  2003     Family History   Problem Relation Age of Onset    Emphysema Father     Heart disease Father         chf     Social History     Socioeconomic History    Marital status:      Spouse name: Not on file    Number of children: Not on file    Years of education: Not on file    Highest education level: Not on file   Occupational History    Not on file   Social Needs    Financial resource strain: Not on file    Food insecurity:     Worry: Not on file     Inability: Not on file    Transportation needs:     Medical: Not on file     Non-medical: Not on file   Tobacco Use    Smoking status: Never Smoker   Substance and Sexual Activity    Alcohol use: No    Drug use: Not on file    Sexual activity: Not on file   Lifestyle    Physical activity:     Days per week: Not on file     Minutes per session: Not on file    Stress: Not on file   Relationships    Social connections:     Talks on phone: Not on file     Gets together: Not on file     Attends Rastafarian service: Not on file     Active member of club or organization: Not on file     Attends meetings of clubs or organizations: Not on file     Relationship status: Not on file   Other Topics Concern    Not on file   Social History Narrative    Not on file       Medications  Scheduled Meds:   azithromycin  500 mg Intravenous Q24H    cefTRIAXone (ROCEPHIN) IVPB  1 g Intravenous Q24H    enoxaparin  40 mg Subcutaneous Daily     Continuous Infusions:  PRN Meds:.acetaminophen, albuterol, glucose, glucose, melatonin, ondansetron, prochlorperazine, sodium chloride 0.9%, sodium chloride 0.9%    Allergies  Patient has no known allergies.    Physical Examination    Temp:  [99.1 °F (37.3 °C)-101.1 °F (38.4 °C)]   Pulse:  [70-88]   Resp:  [18-24]   BP: (121-162)/(65-99)   SpO2:  [88 %-99 %]     Gen: NAD, conversant  Head: NC, AT  Eyes: PERRLA, EOMI  Throat: MMM, OP clear  CV: RRR, no M/R/G, no peripheral edema  Resp:  bilateral crackles, no increased work of breathing on 4L  GI: Soft, NT, ND, +BS  Ext: MAEW, no c/c/e  Neuro: AAOx3, CN grossly intact, no focal neurologic deficits  Psychiatry: Normal mood, normal affect    Laboratory:    Recent Labs   Lab 03/17/20 1618 03/18/20 0453   WBC 2.20* 2.68*   HGB 15.6 15.4   HCT 47.3 47.2   * 112*     Recent Labs   Lab 03/17/20 1618 03/18/20 0453   * 131*   K 4.3 4.4   CL 99 97   CO2 24 24   BUN 14 15   CREATININE 1.3 1.4    97   CALCIUM 8.4* 8.3*     Recent Labs   Lab 03/17/20 1618 03/18/20 0453   ALKPHOS 50* 51*   ALT 50* 53*   AST 65* 68*   ALBUMIN 3.5 3.4*   PROT 7.0 7.1   BILITOT 0.6 0.6      No results for input(s): POCTGLUCOSE in the last 168 hours.  No results for input(s): CPK, CPKMB, MB, TROPONINI in the last 72 hours.    Recent Labs     03/17/20 1618   LACTATE 1.0        No results for input(s): POCTGLUCOSE in the last 168 hours.  No results found for: LABA1C, HGBA1C      Microbiology:  Microbiology Results (last 7 days)     Procedure Component Value Units Date/Time    Blood culture #1 **CANNOT BE ORDERED STAT** [[id number]] Collected:  03/17/20 1618    Order Status:  Completed Specimen:  Blood from Peripheral, Antecubital, Right Updated:  03/18/20 0115     Blood Culture, Routine No Growth to date    Blood culture #2 **CANNOT BE ORDERED STAT** [[id number]] Collected:  03/17/20 1618    Order Status:  Completed Specimen:  Blood from Peripheral, Hand, Right Updated:  03/18/20 0115     Blood Culture, Routine No Growth to date            Imaging  ECG Results          EKG 12-lead (Final result)  Result time 03/18/20 12:25:16    Final result by Interface, Lab In [facility] (03/18/20 12:25:16)                 Narrative:    Test Reason : R06.02,    Vent. Rate : 070 BPM     Atrial Rate : 070 BPM     P-R Int : 140 ms          QRS Dur : 074 ms      QT Int : 376 ms       P-R-T Axes : 072 038 020 degrees     QTc Int : 406 ms    Normal sinus rhythm  Anterior infarct ,age  undetermined  Abnormal ECG  No previous ECGs available  Confirmed by ERIKA BUSH MD ([id number]) on 3/18/2020 12:25:09 PM    Referred By: [email or web address]   SELF           Confirmed By:ERIKA BUSH MD                                No results found for this or any previous visit.      X-Ray Chest AP Portable  Narrative: EXAMINATION:  XR CHEST AP PORTABLE    CLINICAL HISTORY:  cough;    TECHNIQUE:  Single frontal view of the chest was performed.    COMPARISON:  None    FINDINGS:  The cardiomediastinal silhouette is not enlarged.  There is no pleural effusion.  The trachea is midline.  The lungs are symmetrically expanded bilaterally with mildly coarse interstitial attenuation.  There is bandlike increased attenuation projected over the left lower lung zone and right lower lung zone.  No large focal consolidation seen.  There is no pneumothorax.  The osseous structures are remarkable for degenerative changes..  Impression: 1. Bandlike increased attenuation projected over the bilateral lower lung zones, left greater than right, may be on the basis of shallow inspiratory effort/atelectasis however developing consolidation is not excluded.  Consider PA and lateral for further evaluation as warranted.    Electronically signed by: Dav Ulloa MD  Date:    03/17/2020  Time:    15:57          Assessment and Plan:    Active Hospital Problems    Diagnosis  POA    *Suspected Covid-19 Virus Infection [R68.89]  Unknown    Hypoxia [R09.02]  Yes      Resolved Hospital Problems   No resolved problems to display.       Suspected Covid-19 Virus Infection  Person under investigation (PUI) for COVID-19    - COVID-19 testing sent on 3/18 at 1618  - He has what we have thus far identified as a classic history, examination, and lab presentation of COVID19. Suspicion extremely high. Low threshold for crit care  - Infection Control notified    - Isolation:   - Airborne and Droplet Precautions  - N95 masks must be fit tested, wear eye protection  -  "20 second hand hygiene   - Limit visitors per hospital policy   - Consolidating lab draws, nursing care, and interventions    - Diagnostics:    - CBC with leukopenia, now Q48H  - CMP with hyponatremia, now Q48H  - Procalcitonin neg  - D-dimer fri AM  - Ferritin 843  - CRP 27.4  -   - BNP 10  - Troponin deferred   - ECG NSR   - rapid Flu neg   - RIP only if BMT/solid transplant   - Legionella antigen deferred   - Blood culture x2 NGTD   - Portable CXR "Bandlike increased attenuation projected over the bilateral lower lung zones, left greater than right, may be on the basis of shallow inspiratory effort/atelectasis however developing consolidation is not excluded. "   - UA and culture deferred    - Management:   - Supplemental O2 to maintain SpO2 >92%   - Telemetry & Continuous Pulse Ox   - albuterol INHALER PRN (avoid nebulization of secretions)   - Avoiding BiPAP to prevent aerosolization (including home BiPAP)   - Avoiding NSAIDS for fever per WHO recommendations (3/16/2020)   - Careful use of steroids in the absence of other indications - unless septic shock due to increased viral replication   - Fluid sparing resuscitation   - Empiric antibiotics per likely source & patient allergies    - CAP: azithromycin & ceftriaxone    Acute respiratory failure with hypoxia    - NO history of pulmonary disease, now requires 2L to maintain SpO2 mid 90s  - Suspect viral precipitant  - Continue supportive care  - azithromycin + CTX for bacterial superinfection  - SpO2 continuous    Patient's chronic/stable medical conditions noted in the assessment note above will be manage with the patient's home medications as tolerated.     High Risk Conditions:   Patient has a condition that poses threat to life and bodily function: acute hypoxic respiratory failure     Nguyễn Lambert M.D.  Department of Hospital Medicine  [facility]  [phone] (pager)        "

## 2020-03-19 PROCEDURE — 99231 SBSQ HOSP IP/OBS SF/LOW 25: CPT | Mod: ,,, | Performed by: HOSPITALIST

## 2020-03-19 PROCEDURE — 63600175 PHARM REV CODE 636 W HCPCS: Performed by: HOSPITALIST

## 2020-03-19 PROCEDURE — 63600175 PHARM REV CODE 636 W HCPCS: Performed by: INTERNAL MEDICINE

## 2020-03-19 PROCEDURE — 99231 PR SUBSEQUENT HOSPITAL CARE,LEVL I: ICD-10-PCS | Mod: ,,, | Performed by: HOSPITALIST

## 2020-03-19 PROCEDURE — 11000001 HC ACUTE MED/SURG PRIVATE ROOM

## 2020-03-19 RX ADMIN — CEFTRIAXONE SODIUM 1 G: 1 INJECTION, POWDER, FOR SOLUTION INTRAMUSCULAR; INTRAVENOUS at 09:03

## 2020-03-19 RX ADMIN — ENOXAPARIN SODIUM 40 MG: 100 INJECTION SUBCUTANEOUS at 05:03

## 2020-03-19 RX ADMIN — AZITHROMYCIN DIHYDRATE 500 MG: 500 INJECTION, POWDER, LYOPHILIZED, FOR SOLUTION INTRAVENOUS at 09:03

## 2020-03-19 RX ADMIN — SODIUM CHLORIDE 1000 ML: 0.9 INJECTION, SOLUTION INTRAVENOUS at 05:03

## 2020-03-19 NOTE — PLAN OF CARE
Assessment obtained per phone call with Elsa Caban spouse due to patient isolation status.  Mrs Caban anticipates patient being dc'd to home with possible HH services. She is able to transport patient home.        03/19/20 1116   Discharge Assessment   Assessment Type Discharge Planning Assessment   Confirmed/corrected address and phone number on facesheet? Yes   Assessment information obtained from? Caregiver  (Elsa Caban spouse)   Expected Length of Stay (days) 5   Communicated expected length of stay with patient/caregiver yes   Prior to hospitilization cognitive status: Alert/Oriented   Prior to hospitalization functional status: Independent   Current cognitive status: Alert/Oriented   Current Functional Status: Independent   Lives With spouse   Able to Return to Prior Arrangements yes   Is patient able to care for self after discharge? Unable to determine at this time (comments)   Who are your caregiver(s) and their phone number(s)? Elsa Caban spouse [phone]   Patient's perception of discharge disposition home health   Readmission Within the Last 30 Days no previous admission in last 30 days   Patient currently being followed by outpatient case management? Unable to determine (comments)   Patient currently receives any other outside agency services? No   Equipment Currently Used at Home none   Do you have any problems affording any of your prescribed medications? No   Is the patient taking medications as prescribed? yes   Does the patient have transportation home? Yes   Transportation Anticipated family or friend will provide   Dialysis Name and Scheduled days n/a   Does the patient receive services at the [facility]mad[facility]? No   Discharge Plan A Home Health;Home with family   Discharge Plan B Home with family   DME Needed Upon Discharge  none   Patient/Family in Agreement with Plan yes

## 2020-03-19 NOTE — PLAN OF CARE
Spoke with patients family. Provided updates and all questions and concerns answered.         Marsha Gray MD     Internal Medicine PGY3

## 2020-03-19 NOTE — PLAN OF CARE
Quiet hours. No complaints. Slept well. Temp max 103.2, came down with tylenol. Receiving iv antibiotics. Safety maintained.

## 2020-03-20 LAB
ALBUMIN SERPL BCP-MCNC: 2.9 G/DL (ref 3.5–5.2)
ALP SERPL-CCNC: 45 U/L (ref 55–135)
ALT SERPL W/O P-5'-P-CCNC: 67 U/L (ref 10–44)
ANION GAP SERPL CALC-SCNC: 9 MMOL/L (ref 8–16)
AST SERPL-CCNC: 72 U/L (ref 10–40)
BASOPHILS # BLD AUTO: 0.02 K/UL (ref 0–0.2)
BASOPHILS NFR BLD: 0.8 % (ref 0–1.9)
BILIRUB SERPL-MCNC: 0.6 MG/DL (ref 0.1–1)
BUN SERPL-MCNC: 14 MG/DL (ref 6–20)
CALCIUM SERPL-MCNC: 7.9 MG/DL (ref 8.7–10.5)
CHLORIDE SERPL-SCNC: 99 MMOL/L (ref 95–110)
CO2 SERPL-SCNC: 24 MMOL/L (ref 23–29)
CREAT SERPL-MCNC: 1.1 MG/DL (ref 0.5–1.4)
D DIMER PPP IA.FEU-MCNC: 0.77 MG/L FEU
DIFFERENTIAL METHOD: ABNORMAL
EOSINOPHIL # BLD AUTO: 0 K/UL (ref 0–0.5)
EOSINOPHIL NFR BLD: 0 % (ref 0–8)
ERYTHROCYTE [DISTWIDTH] IN BLOOD BY AUTOMATED COUNT: 12.2 % (ref 11.5–14.5)
EST. GFR  (AFRICAN AMERICAN): >60 ML/MIN/1.73 M^2
EST. GFR  (NON AFRICAN AMERICAN): >60 ML/MIN/1.73 M^2
GLUCOSE SERPL-MCNC: 94 MG/DL (ref 70–110)
HCT VFR BLD AUTO: 45.2 % (ref 40–54)
HGB BLD-MCNC: 15 G/DL (ref 14–18)
IMM GRANULOCYTES # BLD AUTO: 0.01 K/UL (ref 0–0.04)
IMM GRANULOCYTES NFR BLD AUTO: 0.4 % (ref 0–0.5)
LYMPHOCYTES # BLD AUTO: 0.9 K/UL (ref 1–4.8)
LYMPHOCYTES NFR BLD: 37.1 % (ref 18–48)
MCH RBC QN AUTO: 31.8 PG (ref 27–31)
MCHC RBC AUTO-ENTMCNC: 33.2 G/DL (ref 32–36)
MCV RBC AUTO: 96 FL (ref 82–98)
MONOCYTES # BLD AUTO: 0.2 K/UL (ref 0.3–1)
MONOCYTES NFR BLD: 6.1 % (ref 4–15)
NEUTROPHILS # BLD AUTO: 1.4 K/UL (ref 1.8–7.7)
NEUTROPHILS NFR BLD: 55.6 % (ref 38–73)
NRBC BLD-RTO: 0 /100 WBC
PLATELET # BLD AUTO: 155 K/UL (ref 150–350)
PMV BLD AUTO: 9.9 FL (ref 9.2–12.9)
POTASSIUM SERPL-SCNC: 5.1 MMOL/L (ref 3.5–5.1)
PROT SERPL-MCNC: 6.5 G/DL (ref 6–8.4)
RBC # BLD AUTO: 4.72 M/UL (ref 4.6–6.2)
SODIUM SERPL-SCNC: 132 MMOL/L (ref 136–145)
WBC # BLD AUTO: 2.45 K/UL (ref 3.9–12.7)

## 2020-03-20 PROCEDURE — 11000001 HC ACUTE MED/SURG PRIVATE ROOM

## 2020-03-20 PROCEDURE — 80053 COMPREHEN METABOLIC PANEL: CPT

## 2020-03-20 PROCEDURE — 36415 COLL VENOUS BLD VENIPUNCTURE: CPT

## 2020-03-20 PROCEDURE — 63600175 PHARM REV CODE 636 W HCPCS: Performed by: INTERNAL MEDICINE

## 2020-03-20 PROCEDURE — 85379 FIBRIN DEGRADATION QUANT: CPT

## 2020-03-20 PROCEDURE — 25000003 PHARM REV CODE 250: Performed by: HOSPITALIST

## 2020-03-20 PROCEDURE — 63600175 PHARM REV CODE 636 W HCPCS: Performed by: HOSPITALIST

## 2020-03-20 PROCEDURE — 85025 COMPLETE CBC W/AUTO DIFF WBC: CPT

## 2020-03-20 RX ADMIN — AZITHROMYCIN DIHYDRATE 500 MG: 500 INJECTION, POWDER, LYOPHILIZED, FOR SOLUTION INTRAVENOUS at 11:03

## 2020-03-20 RX ADMIN — ACETAMINOPHEN 650 MG: 325 TABLET ORAL at 10:03

## 2020-03-20 RX ADMIN — CEFTRIAXONE SODIUM 1 G: 1 INJECTION, POWDER, FOR SOLUTION INTRAMUSCULAR; INTRAVENOUS at 10:03

## 2020-03-20 NOTE — PROGRESS NOTES
Hospital Medicine  Progress Note  [facility]      Patient Name: Marina Caban  MRN:  [id number]  Hospital Medicine Team: [facility]OSP MED A Gregg Barclay MD  Date of Admission:  3/17/2020     Length of Stay:  LOS: 2 days       Principal Problem:  Suspected Covid-19 Virus Infection      Hospital Course:  Patient admitted for evaluation of possible COVID-19.    51 y/o with no known medical history, employed at [employer] admitted after having a dry cough and developing dyspnea, dyspnea on exertion with recurrent fevers.          Interval History:     The following was obtained via a telephone evaluation to minimize contact and the chances of viral exposure. This was discussed with  who agreed to this evaluation format.    Examination deferred, telephone encounter    Patient reports feeling well, still with fevers, O2 sats 2-3L     Review of Systems   Constitutional: Positive for fever.   Respiratory: Positive for cough and shortness of breath.        Inpatient Medications:    Current Facility-Administered Medications:     acetaminophen tablet 650 mg, 650 mg, Oral, Q4H PRN, Cuba Marcos MD, 650 mg at 03/18/20 2052    albuterol inhaler 2 puff, 2 puff, Inhalation, Q6H PRN, Cuba Marcos MD, 2 puff at 03/18/20 1422    azithromycin 500 mg in dextrose 5 % 250 mL IVPB (ready to mix system), 500 mg, Intravenous, Q24H, Nguyễn Lambert MD, Last Rate: 250 mL/hr at 03/19/20 2103, 500 mg at 03/19/20 2103    cefTRIAXone injection 1 g, 1 g, Intravenous, Q24H, Nguyễn Lambert MD, 1 g at 03/19/20 2103    enoxaparin injection 40 mg, 40 mg, Subcutaneous, Daily, Cuba Marcos MD, 40 mg at 03/19/20 1726    glucose chewable tablet 16 g, 16 g, Oral, PRN, Cuba Marcos MD    glucose chewable tablet 24 g, 24 g, Oral, PRN, Cuba Marcos MD    melatonin tablet 6 mg, 6 mg, Oral, Nightly PRN, Cuba Marcos MD    ondansetron injection 4 mg, 4 mg, Intravenous, Q8H PRN, Cuba  SAMARA Marcos MD    prochlorperazine injection Soln 5 mg, 5 mg, Intravenous, Q6H PRN, Cuba Marcos MD    sodium chloride 0.9% flush 10 mL, 10 mL, Intravenous, PRN, Erica Oconnor PA-C    sodium chloride 0.9% flush 10 mL, 10 mL, Intravenous, PRN, Cuba Marcos MD      Physical Exam:    No intake or output data in the 24 hours ending 03/19/20 2318  Wt Readings from Last 3 Encounters:   03/18/20 86.2 kg (190 lb 0.6 oz)   03/16/20 86.2 kg (190 lb)   01/23/17 83.9 kg (185 lb)       /73   Pulse 76   Temp 99.2 °F (37.3 °C)   Resp 20   Ht 6' (1.829 m)   Wt 86.2 kg (190 lb 0.6 oz)   SpO2 (!) 93%   BMI 25.77 kg/m²     Physical Exam   Deferred see interval history.     Laboratory:    Recent Labs   Lab 03/17/20 1618 03/18/20 0453   WBC 2.20* 2.68*   LYMPH 38.0  CANCELED 40.7  1.1   HGB 15.6 15.4   HCT 47.3 47.2   * 112*     Recent Labs   Lab 03/17/20 1618 03/18/20  0453   * 131*   K 4.3 4.4   CL 99 97   CO2 24 24   BUN 14 15   CREATININE 1.3 1.4    97   CALCIUM 8.4* 8.3*     Recent Labs   Lab 03/17/20 1618 03/18/20  0453   ALKPHOS 50* 51*   ALT 50* 53*   AST 65* 68*   ALBUMIN 3.5 3.4*   PROT 7.0 7.1   BILITOT 0.6 0.6        Recent Labs     03/17/20 1618 03/18/20  0453   FERRITIN  --  843*   CRP 27.4*  --    LDH  --  414*   BNP  --  10       All labs within the last 24 hours were reviewed.     Microbiology:  Microbiology Results (last 7 days)     Procedure Component Value Units Date/Time    Blood culture #1 **CANNOT BE ORDERED STAT** [[id number]] Collected:  03/17/20 1618    Order Status:  Completed Specimen:  Blood from Peripheral, Antecubital, Right Updated:  03/19/20 1812     Blood Culture, Routine No Growth to date      No Growth to date      No Growth to date    Blood culture #2 **CANNOT BE ORDERED STAT** [[id number]] Collected:  03/17/20 1618    Order Status:  Completed Specimen:  Blood from Peripheral, Hand, Right Updated:  03/19/20 1812     Blood Culture, Routine No Growth  to date      No Growth to date      No Growth to date          No results found for: [id number]        Imaging  ECG Results          EKG 12-lead (Final result)  Result time 03/18/20 12:25:16    Final result by Interface, Lab In [facility] (03/18/20 12:25:16)                 Narrative:    Test Reason : R06.02,    Vent. Rate : 070 BPM     Atrial Rate : 070 BPM     P-R Int : 140 ms          QRS Dur : 074 ms      QT Int : 376 ms       P-R-T Axes : 072 038 020 degrees     QTc Int : 406 ms    Normal sinus rhythm  Anterior infarct ,age undetermined  Abnormal ECG  No previous ECGs available  Confirmed by ERIKA BUSH MD ([id number]) on 3/18/2020 12:25:09 PM    Referred By: AAAREFANNABEL   SELF           Confirmed By:ERIKA BUHS MD                              No results found for this or any previous visit.    X-Ray Chest AP Portable  Narrative: EXAMINATION:  XR CHEST AP PORTABLE    CLINICAL HISTORY:  cough;    TECHNIQUE:  Single frontal view of the chest was performed.    COMPARISON:  None    FINDINGS:  The cardiomediastinal silhouette is not enlarged.  There is no pleural effusion.  The trachea is midline.  The lungs are symmetrically expanded bilaterally with mildly coarse interstitial attenuation.  There is bandlike increased attenuation projected over the left lower lung zone and right lower lung zone.  No large focal consolidation seen.  There is no pneumothorax.  The osseous structures are remarkable for degenerative changes..  Impression: 1. Bandlike increased attenuation projected over the bilateral lower lung zones, left greater than right, may be on the basis of shallow inspiratory effort/atelectasis however developing consolidation is not excluded.  Consider PA and lateral for further evaluation as warranted.    Electronically signed by: Dav Ulloa MD  Date:    03/17/2020  Time:    15:57      All imaging within the last 24 hours was reviewed.     Assessment and Plan:    Active Hospital Problems    Diagnosis  POA     "*Suspected Covid-19 Virus Infection [R68.89]  Yes    Acute respiratory failure with hypoxia [J96.01]  Yes      Resolved Hospital Problems   No resolved problems to display.     Suspected Covid-19 Virus Infection  Person under investigation (PUI) for COVID-19     - COVID-19 testing sent on 3/18 at 1618  - He has what we have thus far identified as a classic history, examination, and lab presentation of COVID19. Suspicion extremely high. Low threshold for crit care  - Infection Control notified     - Isolation:   - Airborne and Droplet Precautions  - N95 masks must be fit tested, wear eye protection  - 20 second hand hygiene              - Limit visitors per hospital policy              - Consolidating lab draws, nursing care, and interventions     - Diagnostics:               - CBC with leukopenia, now Q48H  - CMP with hyponatremia, now Q48H  - Procalcitonin neg  - D-dimer fri AM  - Ferritin 843  - CRP 27.4  -   - BNP 10  - Troponin deferred              - ECG NSR              - rapid Flu neg              - RIP only if BMT/solid transplant              - Legionella antigen deferred              - Blood culture x2 NGTD              - Portable CXR "Bandlike increased attenuation projected over the bilateral lower lung zones, left greater than right, may be on the basis of shallow inspiratory effort/atelectasis however developing consolidation is not excluded. "              - UA and culture deferred     - Management:              - Supplemental O2 to maintain SpO2 >92%              - Telemetry & Continuous Pulse Ox              - albuterol INHALER PRN (avoid nebulization of secretions)              - Avoiding BiPAP to prevent aerosolization (including home BiPAP)              - Avoiding NSAIDS for fever per WHO recommendations (3/16/2020)              - Careful use of steroids in the absence of other indications - unless septic shock due to increased viral replication              - Fluid sparing " resuscitation              - Empiric antibiotics per likely source & patient allergies                          - CAP: azithromycin & ceftriaxone     Acute respiratory failure with hypoxia   - NO history of pulmonary disease, now requires Oxygen to maintain SpO2 mid 90s  - Suspect viral precipitant  - Continue supportive care  - azithromycin + CTX for bacterial superinfection  - SpO2 continuous     High Risk Conditions:   Patient has a condition that poses threat to life and bodily function: acute hypoxic respiratory failure     Patient's chronic/stable medical conditions noted in the assessment note above will be manage with the patient's home medications as tolerated.      Patient's chronic/stable medical conditions noted in the problem list above will be managed with the patient's home medications as tolerated.         Gregg Barclay M.D.  Attending Physician  [facility] Medicine Dept.  Pager: [phone]  [facility] -x [phone]

## 2020-03-21 PROCEDURE — 11000001 HC ACUTE MED/SURG PRIVATE ROOM

## 2020-03-21 PROCEDURE — 99231 PR SUBSEQUENT HOSPITAL CARE,LEVL I: ICD-10-PCS | Mod: ,,, | Performed by: HOSPITALIST

## 2020-03-21 PROCEDURE — 63600175 PHARM REV CODE 636 W HCPCS: Performed by: HOSPITALIST

## 2020-03-21 PROCEDURE — 94761 N-INVAS EAR/PLS OXIMETRY MLT: CPT

## 2020-03-21 PROCEDURE — 99231 SBSQ HOSP IP/OBS SF/LOW 25: CPT | Mod: ,,, | Performed by: HOSPITALIST

## 2020-03-21 RX ADMIN — CEFTRIAXONE SODIUM 1 G: 1 INJECTION, POWDER, FOR SOLUTION INTRAMUSCULAR; INTRAVENOUS at 08:03

## 2020-03-21 RX ADMIN — AZITHROMYCIN DIHYDRATE 500 MG: 500 INJECTION, POWDER, LYOPHILIZED, FOR SOLUTION INTRAVENOUS at 08:03

## 2020-03-21 NOTE — PLAN OF CARE
Pt AAOx4, VSS, no complaints of pain or nausea, free of falls and injuries. Pt O2 weaned off of , tele monitoring maintained per orders. Pt had new IV placed. I/O's maintained per orders. Pt Lovenox not given, pt did not want it.   Airborne, droplet and contact precautions maintained per orders No acute changes, see previous notes, will continue to monitor.   Problem: Adult Inpatient Plan of Care  Goal: Plan of Care Review  Outcome: Ongoing, Progressing  Flowsheets (Taken 3/21/2020 1459)  Plan of Care Reviewed With: patient  Goal: Patient-Specific Goal (Individualization)  Outcome: Ongoing, Progressing  Goal: Absence of Hospital-Acquired Illness or Injury  Outcome: Ongoing, Progressing  Intervention: Identify and Manage Fall Risk  Flowsheets (Taken 3/21/2020 1459)  Safety Promotion/Fall Prevention: side rails raised x 2; bed alarm set; assistive device/personal item within reach; diversional activities provided; Fall Risk reviewed with patient/family; Fall Risk signage in place; lighting adjusted; medications reviewed; nonskid shoes/socks when out of bed; commode/urinal/bedpan at bedside  Intervention: Prevent VTE (venous thromboembolism)  Flowsheets (Taken 3/21/2020 1459)  VTE Prevention/Management: bleeding precautions maintained; bleeding risk assessed; bleeding risk factor(s) identified, provider notified; ambulation promoted; fluids promoted  Goal: Optimal Comfort and Wellbeing  Outcome: Ongoing, Progressing  Intervention: Provide Person-Centered Care  Flowsheets (Taken 3/21/2020 1459)  Trust Relationship/Rapport: care explained; choices provided; emotional support provided; empathic listening provided; questions encouraged; thoughts/feelings acknowledged; questions answered; reassurance provided  Goal: Readiness for Transition of Care  Outcome: Ongoing, Progressing  Goal: Rounds/Family Conference  Outcome: Ongoing, Progressing     Problem: Infection  Goal: Infection Symptom Resolution  Outcome: Ongoing,  Progressing  Intervention: Prevent or Manage Infection  Flowsheets (Taken 3/21/2020 1459)  Fever Reduction/Comfort Measures: lightweight clothing; lightweight bedding  Infection Management: aseptic technique maintained  Isolation Precautions: airborne precautions maintained; contact precautions maintained; droplet precautions maintained; protective environment maintained     Problem: Fall Injury Risk  Goal: Absence of Fall and Fall-Related Injury  Outcome: Ongoing, Progressing  Intervention: Identify and Manage Contributors to Fall Injury Risk  Flowsheets (Taken 3/21/2020 1459)  Self-Care Promotion: independence encouraged; BADL personal objects within reach; BADL personal routines maintained; meal setup provided  Medication Review/Management: medications reviewed  Intervention: Promote Injury-Free Environment  Flowsheets (Taken 3/21/2020 1459)  Safety Promotion/Fall Prevention: side rails raised x 2; bed alarm set; assistive device/personal item within reach; diversional activities provided; Fall Risk reviewed with patient/family; Fall Risk signage in place; lighting adjusted; medications reviewed; nonskid shoes/socks when out of bed; commode/urinal/bedpan at bedside  Environmental Safety Modification: assistive device/personal items within reach; room organization consistent; lighting adjusted; clutter free environment maintained

## 2020-03-21 NOTE — PROGRESS NOTES
Hospital Medicine  Progress Note  [facility]      Patient Name: Marina Caban  MRN:  [id number]  Hospital Medicine Team: [facility]OS[facility] MED A Gregg Barclay MD  Date of Admission:  3/17/2020     Length of Stay:  LOS: 3 days       Principal Problem:  Suspected Covid-19 Virus Infection      Hospital Course:  Patient admitted for evaluation of possible COVID-19.    51 y/o with no known medical history, employed at [employer] admitted after having a dry cough and developing dyspnea, dyspnea on exertion with recurrent fevers.          Interval History:     The following was obtained via a telephone evaluation to minimize contact and the chances of viral exposure. This was discussed with  who agreed to this evaluation format.    Examination deferred, telephone encounter    Still short of breath with exertion. Decreased frequency of fevers    Review of Systems   Constitutional: Positive for fever.   Respiratory: Positive for cough and shortness of breath.        Inpatient Medications:    Current Facility-Administered Medications:     acetaminophen tablet 650 mg, 650 mg, Oral, Q4H PRN, Cuba Marcos MD, 650 mg at 03/20/20 2236    albuterol inhaler 2 puff, 2 puff, Inhalation, Q6H PRN, Cuba Marcos MD, 2 puff at 03/18/20 1422    azithromycin 500 mg in dextrose 5 % 250 mL IVPB (ready to mix system), 500 mg, Intravenous, Q24H, Nguyễn Lambert MD, Last Rate: 250 mL/hr at 03/19/20 2103, 500 mg at 03/19/20 2103    cefTRIAXone injection 1 g, 1 g, Intravenous, Q24H, Nguyễn Lambert MD, 1 g at 03/20/20 2236    enoxaparin injection 40 mg, 40 mg, Subcutaneous, Daily, Cuba Marcos MD, 40 mg at 03/19/20 1726    glucose chewable tablet 16 g, 16 g, Oral, PRN, Cuba Marcos MD    glucose chewable tablet 24 g, 24 g, Oral, PRN, Cuba Marcos MD    melatonin tablet 6 mg, 6 mg, Oral, Nightly PRN, Cuba Marcos MD    ondansetron injection 4 mg, 4 mg, Intravenous, Q8H PRN,  Cuba Marcos MD    prochlorperazine injection Soln 5 mg, 5 mg, Intravenous, Q6H PRN, Cuba Marcos MD    sodium chloride 0.9% flush 10 mL, 10 mL, Intravenous, PRN, Erica Oconnor PA-C    sodium chloride 0.9% flush 10 mL, 10 mL, Intravenous, PRN, Cuba Marcos MD      Physical Exam:    No intake or output data in the 24 hours ending 03/20/20 2308  Wt Readings from Last 3 Encounters:   03/18/20 86.2 kg (190 lb 0.6 oz)   03/16/20 86.2 kg (190 lb)   01/23/17 83.9 kg (185 lb)       /75 (Patient Position: Lying)   Pulse 72   Temp (!) 100.7 °F (38.2 °C) (Oral)   Resp 20   Ht 6' (1.829 m)   Wt 86.2 kg (190 lb 0.6 oz)   SpO2 (!) 94%   BMI 25.77 kg/m²     Physical Exam   Deferred see interval history.     Laboratory:    Recent Labs   Lab 03/17/20 1618 03/18/20 0453 03/20/20  0434   WBC 2.20* 2.68* 2.45*   LYMPH 38.0  CANCELED 40.7  1.1 37.1  0.9*   HGB 15.6 15.4 15.0   HCT 47.3 47.2 45.2   * 112* 155     Recent Labs   Lab 03/17/20 1618 03/18/20 0453 03/20/20  0434   * 131* 132*   K 4.3 4.4 5.1   CL 99 97 99   CO2 24 24 24   BUN 14 15 14   CREATININE 1.3 1.4 1.1    97 94   CALCIUM 8.4* 8.3* 7.9*     Recent Labs   Lab 03/17/20 1618 03/18/20 0453 03/20/20  0434   ALKPHOS 50* 51* 45*   ALT 50* 53* 67*   AST 65* 68* 72*   ALBUMIN 3.5 3.4* 2.9*   PROT 7.0 7.1 6.5   BILITOT 0.6 0.6 0.6        Recent Labs     03/18/20 0453 03/20/20  0434   DDIMER  --  0.77*   FERRITIN 843*  --    *  --    BNP 10  --        All labs within the last 24 hours were reviewed.     Microbiology:  Microbiology Results (last 7 days)     Procedure Component Value Units Date/Time    Blood culture #1 **CANNOT BE ORDERED STAT** [[id number]] Collected:  03/17/20 1618    Order Status:  Completed Specimen:  Blood from Peripheral, Antecubital, Right Updated:  03/20/20 1812     Blood Culture, Routine No Growth to date      No Growth to date      No Growth to date      No Growth to date    Blood culture  #2 **CANNOT BE ORDERED STAT** [[id number]] Collected:  03/17/20 1618    Order Status:  Completed Specimen:  Blood from Peripheral, Hand, Right Updated:  03/20/20 1812     Blood Culture, Routine No Growth to date      No Growth to date      No Growth to date      No Growth to date          No results found for: [id number]        Imaging  ECG Results          EKG 12-lead (Final result)  Result time 03/18/20 12:25:16    Final result by Interface, Lab In [facility] (03/18/20 12:25:16)                 Narrative:    Test Reason : R06.02,    Vent. Rate : 070 BPM     Atrial Rate : 070 BPM     P-R Int : 140 ms          QRS Dur : 074 ms      QT Int : 376 ms       P-R-T Axes : 072 038 020 degrees     QTc Int : 406 ms    Normal sinus rhythm  Anterior infarct ,age undetermined  Abnormal ECG  No previous ECGs available  Confirmed by ERIKA BUSH MD ([id number]) on 3/18/2020 12:25:09 PM    Referred By: [email or web address]   SELF           Confirmed By:ERIKA BUSH MD                              No results found for this or any previous visit.    X-Ray Chest AP Portable  Narrative: EXAMINATION:  XR CHEST AP PORTABLE    CLINICAL HISTORY:  cough;    TECHNIQUE:  Single frontal view of the chest was performed.    COMPARISON:  None    FINDINGS:  The cardiomediastinal silhouette is not enlarged.  There is no pleural effusion.  The trachea is midline.  The lungs are symmetrically expanded bilaterally with mildly coarse interstitial attenuation.  There is bandlike increased attenuation projected over the left lower lung zone and right lower lung zone.  No large focal consolidation seen.  There is no pneumothorax.  The osseous structures are remarkable for degenerative changes..  Impression: 1. Bandlike increased attenuation projected over the bilateral lower lung zones, left greater than right, may be on the basis of shallow inspiratory effort/atelectasis however developing consolidation is not excluded.  Consider PA and lateral for further evaluation as  "warranted.    Electronically signed by: Dav Ulloa MD  Date:    03/17/2020  Time:    15:57      All imaging within the last 24 hours was reviewed.     Assessment and Plan:    Active Hospital Problems    Diagnosis  POA    *Suspected Covid-19 Virus Infection [R68.89]  Yes    Acute respiratory failure with hypoxia [J96.01]  Yes      Resolved Hospital Problems   No resolved problems to display.     Suspected Covid-19 Virus Infection  Person under investigation (PUI) for COVID-19     - COVID-19 testing sent on 3/18 at 1618  - He has what we have thus far identified as a classic history, examination, and lab presentation of COVID19. Suspicion extremely high. Low threshold for crit care  - Infection Control notified     - Isolation:   - Airborne and Droplet Precautions  - N95 masks must be fit tested, wear eye protection  - 20 second hand hygiene              - Limit visitors per hospital policy              - Consolidating lab draws, nursing care, and interventions     - Diagnostics:               - CBC with leukopenia, now Q48H  - CMP with hyponatremia, now Q48H  - Procalcitonin neg  - D-dimer fri AM  - Ferritin 843  - CRP 27.4  -   - BNP 10  - Troponin deferred              - ECG NSR              - rapid Flu neg              -               - Legionella antigen deferred              - Blood culture x2 NGTD              - Portable CXR "Bandlike increased attenuation projected over the bilateral lower lung zones, left greater than right, may be on the basis of shallow inspiratory effort/atelectasis however developing consolidation is not excluded. "              - UA and culture deferred     - Management:              - Supplemental O2 to maintain SpO2 >92%              - Telemetry & Continuous Pulse Ox              - albuterol INHALER PRN (avoid nebulization of secretions)              - Avoiding BiPAP to prevent aerosolization (including home BiPAP)              - Avoiding NSAIDS for fever per WHO " recommendations (3/16/2020)              - Careful use of steroids in the absence of other indications - unless septic shock due to increased viral replication              - Fluid sparing resuscitation              - Empiric antibiotics per likely source & patient allergies                          - CAP: azithromycin & ceftriaxone     Acute respiratory failure with hypoxia   - NO history of pulmonary disease, now requires Oxygen to maintain SpO2 mid 90s  - Suspect viral precipitant  - Continue supportive care  - azithromycin + CTX for bacterial superinfection  - day 4 of 5  - SpO2 continuous     High Risk Conditions:   Patient has a condition that poses threat to life and bodily function: acute hypoxic respiratory failure     Patient's chronic/stable medical conditions noted in the assessment note above will be manage with the patient's home medications as tolerated.      Patient's chronic/stable medical conditions noted in the problem list above will be managed with the patient's home medications as tolerated.         Gregg Barclay M.D.  Attending Physician  [facility] Medicine Dept.  Pager: [phone]  [facility] -[phone]

## 2020-03-22 VITALS
DIASTOLIC BLOOD PRESSURE: 90 MMHG | WEIGHT: 190.06 LBS | SYSTOLIC BLOOD PRESSURE: 123 MMHG | BODY MASS INDEX: 25.74 KG/M2 | HEART RATE: 79 BPM | HEIGHT: 72 IN | OXYGEN SATURATION: 95 % | TEMPERATURE: 97 F | RESPIRATION RATE: 18 BRPM

## 2020-03-22 PROBLEM — J96.01 ACUTE RESPIRATORY FAILURE WITH HYPOXIA: Status: RESOLVED | Noted: 2020-03-18 | Resolved: 2020-03-22

## 2020-03-22 PROBLEM — R74.8 ABNORMAL LIVER ENZYMES: Status: ACTIVE | Noted: 2020-03-22

## 2020-03-22 PROBLEM — E87.1 HYPONATREMIA: Status: ACTIVE | Noted: 2020-03-22

## 2020-03-22 LAB
ALBUMIN SERPL BCP-MCNC: 3 G/DL (ref 3.5–5.2)
ALP SERPL-CCNC: 53 U/L (ref 55–135)
ALT SERPL W/O P-5'-P-CCNC: 148 U/L (ref 10–44)
ANION GAP SERPL CALC-SCNC: 11 MMOL/L (ref 8–16)
AST SERPL-CCNC: 123 U/L (ref 10–40)
BACTERIA BLD CULT: NORMAL
BACTERIA BLD CULT: NORMAL
BASOPHILS # BLD AUTO: 0.02 K/UL (ref 0–0.2)
BASOPHILS NFR BLD: 0.7 % (ref 0–1.9)
BILIRUB SERPL-MCNC: 0.6 MG/DL (ref 0.1–1)
BUN SERPL-MCNC: 11 MG/DL (ref 6–20)
CALCIUM SERPL-MCNC: 8.5 MG/DL (ref 8.7–10.5)
CHLORIDE SERPL-SCNC: 100 MMOL/L (ref 95–110)
CO2 SERPL-SCNC: 22 MMOL/L (ref 23–29)
CREAT SERPL-MCNC: 1 MG/DL (ref 0.5–1.4)
CRP SERPL-MCNC: 73.4 MG/L (ref 0–8.2)
DIFFERENTIAL METHOD: ABNORMAL
EOSINOPHIL # BLD AUTO: 0 K/UL (ref 0–0.5)
EOSINOPHIL NFR BLD: 0.7 % (ref 0–8)
ERYTHROCYTE [DISTWIDTH] IN BLOOD BY AUTOMATED COUNT: 12.2 % (ref 11.5–14.5)
EST. GFR  (AFRICAN AMERICAN): >60 ML/MIN/1.73 M^2
EST. GFR  (NON AFRICAN AMERICAN): >60 ML/MIN/1.73 M^2
GLUCOSE SERPL-MCNC: 97 MG/DL (ref 70–110)
HCT VFR BLD AUTO: 46.6 % (ref 40–54)
HGB BLD-MCNC: 15.4 G/DL (ref 14–18)
IMM GRANULOCYTES # BLD AUTO: 0.01 K/UL (ref 0–0.04)
IMM GRANULOCYTES NFR BLD AUTO: 0.4 % (ref 0–0.5)
LYMPHOCYTES # BLD AUTO: 0.9 K/UL (ref 1–4.8)
LYMPHOCYTES NFR BLD: 33.6 % (ref 18–48)
MCH RBC QN AUTO: 31 PG (ref 27–31)
MCHC RBC AUTO-ENTMCNC: 33 G/DL (ref 32–36)
MCV RBC AUTO: 94 FL (ref 82–98)
MONOCYTES # BLD AUTO: 0.3 K/UL (ref 0.3–1)
MONOCYTES NFR BLD: 9.7 % (ref 4–15)
NEUTROPHILS # BLD AUTO: 1.5 K/UL (ref 1.8–7.7)
NEUTROPHILS NFR BLD: 54.9 % (ref 38–73)
NRBC BLD-RTO: 0 /100 WBC
PLATELET # BLD AUTO: 238 K/UL (ref 150–350)
PMV BLD AUTO: 9.4 FL (ref 9.2–12.9)
POTASSIUM SERPL-SCNC: 4.1 MMOL/L (ref 3.5–5.1)
PROT SERPL-MCNC: 6.6 G/DL (ref 6–8.4)
RBC # BLD AUTO: 4.97 M/UL (ref 4.6–6.2)
SODIUM SERPL-SCNC: 133 MMOL/L (ref 136–145)
WBC # BLD AUTO: 2.68 K/UL (ref 3.9–12.7)

## 2020-03-22 PROCEDURE — 99239 HOSP IP/OBS DSCHRG MGMT >30: CPT | Mod: ,,, | Performed by: HOSPITALIST

## 2020-03-22 PROCEDURE — 94761 N-INVAS EAR/PLS OXIMETRY MLT: CPT

## 2020-03-22 PROCEDURE — 86140 C-REACTIVE PROTEIN: CPT

## 2020-03-22 PROCEDURE — 36415 COLL VENOUS BLD VENIPUNCTURE: CPT

## 2020-03-22 PROCEDURE — 99900035 HC TECH TIME PER 15 MIN (STAT)

## 2020-03-22 PROCEDURE — 85025 COMPLETE CBC W/AUTO DIFF WBC: CPT

## 2020-03-22 PROCEDURE — 80053 COMPREHEN METABOLIC PANEL: CPT

## 2020-03-22 PROCEDURE — 99239 PR HOSPITAL DISCHARGE DAY,>30 MIN: ICD-10-PCS | Mod: ,,, | Performed by: HOSPITALIST

## 2020-03-22 RX ORDER — ACETAMINOPHEN 325 MG/1
650 TABLET ORAL EVERY 8 HOURS PRN
Refills: 0 | COMMUNITY
Start: 2020-03-22 | End: 2022-06-16 | Stop reason: ALTCHOICE

## 2020-03-22 RX ORDER — ALBUTEROL SULFATE 90 UG/1
2 AEROSOL, METERED RESPIRATORY (INHALATION) EVERY 6 HOURS PRN
Qty: 18 G | Refills: 1 | Status: SHIPPED | OUTPATIENT
Start: 2020-03-22 | End: 2022-06-16 | Stop reason: ALTCHOICE

## 2020-03-22 NOTE — PROGRESS NOTES
Hospital Medicine  Progress Note  [facility]      Patient Name: Marina Caban  MRN:  [id number]  Hospital Medicine Team: [facility] MED A Gregg Barclay MD  Date of Admission:  3/17/2020     Length of Stay:  LOS: 4 days       Principal Problem:  Suspected Covid-19 Virus Infection      Hospital Course:  Patient admitted for evaluation of possible COVID-19.    49 y/o with no known medical history, employed at [employer] admitted after having a dry cough and developing dyspnea, dyspnea on exertion with recurrent fevers and lab markers concerning for COVID19 infection.       Interval History:     The following was obtained via a telephone evaluation to minimize contact and the chances of viral exposure. This was discussed with  who agreed to this evaluation format.    Patient has been weaned to room air, he reports feeling better, nurse reports pt doing well.     On the phone he is speaking in gregory sentences.     He still has poor appetite and am adding boost to his meals     Review of Systems   Constitutional: Negative for fever.   Respiratory: Positive for cough and shortness of breath.        Inpatient Medications:    Current Facility-Administered Medications:     acetaminophen tablet 650 mg, 650 mg, Oral, Q4H PRN, Cuba Marcos MD, 650 mg at 03/20/20 2236    albuterol inhaler 2 puff, 2 puff, Inhalation, Q6H PRN, Cuba Marcos MD, 2 puff at 03/18/20 1422    azithromycin 500 mg in dextrose 5 % 250 mL IVPB (ready to mix system), 500 mg, Intravenous, Q24H, Gregg Barclay MD, Last Rate: 250 mL/hr at 03/20/20 2312, 500 mg at 03/20/20 2312    cefTRIAXone injection 1 g, 1 g, Intravenous, Q24H, Gregg Barclay MD, 1 g at 03/20/20 2236    enoxaparin injection 40 mg, 40 mg, Subcutaneous, Daily, Cuba Marcos MD, 40 mg at 03/19/20 1726    glucose chewable tablet 16 g, 16 g, Oral, PRN, Cuba Marcos MD    glucose chewable tablet 24 g, 24 g, Oral, PRN, Cuba PASCUAL  MD Hemant    melatonin tablet 6 mg, 6 mg, Oral, Nightly PRN, Cuba Marcos MD    ondansetron injection 4 mg, 4 mg, Intravenous, Q8H PRN, Cuba Marcos MD    prochlorperazine injection Soln 5 mg, 5 mg, Intravenous, Q6H PRN, Cuba Marcos MD    sodium chloride 0.9% flush 10 mL, 10 mL, Intravenous, PRN, Erica Oconnor PA-C    sodium chloride 0.9% flush 10 mL, 10 mL, Intravenous, PRN, Cuba Marcos MD      Physical Exam:    No intake or output data in the 24 hours ending 03/21/20 1907  Wt Readings from Last 3 Encounters:   03/18/20 86.2 kg (190 lb 0.6 oz)   03/16/20 86.2 kg (190 lb)   01/23/17 83.9 kg (185 lb)       /78   Pulse 67   Temp 99.1 °F (37.3 °C) (Oral)   Resp 18   Ht 6' (1.829 m)   Wt 86.2 kg (190 lb 0.6 oz)   SpO2 97%   BMI 25.77 kg/m²     Physical Exam   Deferred see interval history.     Laboratory:    Recent Labs   Lab 03/17/20 1618 03/18/20 0453 03/20/20  0434   WBC 2.20* 2.68* 2.45*   LYMPH 38.0  CANCELED 40.7  1.1 37.1  0.9*   HGB 15.6 15.4 15.0   HCT 47.3 47.2 45.2   * 112* 155     Recent Labs   Lab 03/17/20 1618 03/18/20 0453 03/20/20  0434   * 131* 132*   K 4.3 4.4 5.1   CL 99 97 99   CO2 24 24 24   BUN 14 15 14   CREATININE 1.3 1.4 1.1    97 94   CALCIUM 8.4* 8.3* 7.9*     Recent Labs   Lab 03/17/20 1618 03/18/20 0453 03/20/20  0434   ALKPHOS 50* 51* 45*   ALT 50* 53* 67*   AST 65* 68* 72*   ALBUMIN 3.5 3.4* 2.9*   PROT 7.0 7.1 6.5   BILITOT 0.6 0.6 0.6        Recent Labs     03/20/20  0434   DDIMER 0.77*       All labs within the last 24 hours were reviewed.     Microbiology:  Microbiology Results (last 7 days)     Procedure Component Value Units Date/Time    Blood culture #1 **CANNOT BE ORDERED STAT** [[id number]] Collected:  03/17/20 1618    Order Status:  Completed Specimen:  Blood from Peripheral, Antecubital, Right Updated:  03/21/20 1812     Blood Culture, Routine No Growth to date      No Growth to date      No Growth to date       No Growth to date      No Growth to date    Blood culture #2 **CANNOT BE ORDERED STAT** [[id number]] Collected:  03/17/20 1618    Order Status:  Completed Specimen:  Blood from Peripheral, Hand, Right Updated:  03/21/20 1812     Blood Culture, Routine No Growth to date      No Growth to date      No Growth to date      No Growth to date      No Growth to date          No results found for: [id number]        Imaging  ECG Results          EKG 12-lead (Final result)  Result time 03/18/20 12:25:16    Final result by Interface, Lab In [facility] (03/18/20 12:25:16)                 Narrative:    Test Reason : R06.02,    Vent. Rate : 070 BPM     Atrial Rate : 070 BPM     P-R Int : 140 ms          QRS Dur : 074 ms      QT Int : 376 ms       P-R-T Axes : 072 038 020 degrees     QTc Int : 406 ms    Normal sinus rhythm  Anterior infarct ,age undetermined  Abnormal ECG  No previous ECGs available  Confirmed by ERIKA BUSH MD ([id number]) on 3/18/2020 12:25:09 PM    Referred By: [email or web address]   SELF           Confirmed By:ERIKA BUSH MD                              No results found for this or any previous visit.    X-Ray Chest AP Portable  Narrative: EXAMINATION:  XR CHEST AP PORTABLE    CLINICAL HISTORY:  cough;    TECHNIQUE:  Single frontal view of the chest was performed.    COMPARISON:  None    FINDINGS:  The cardiomediastinal silhouette is not enlarged.  There is no pleural effusion.  The trachea is midline.  The lungs are symmetrically expanded bilaterally with mildly coarse interstitial attenuation.  There is bandlike increased attenuation projected over the left lower lung zone and right lower lung zone.  No large focal consolidation seen.  There is no pneumothorax.  The osseous structures are remarkable for degenerative changes..  Impression: 1. Bandlike increased attenuation projected over the bilateral lower lung zones, left greater than right, may be on the basis of shallow inspiratory effort/atelectasis however developing  "consolidation is not excluded.  Consider PA and lateral for further evaluation as warranted.    Electronically signed by: Dav Ulloa MD  Date:    03/17/2020  Time:    15:57      All imaging within the last 24 hours was reviewed.     Assessment and Plan:    Active Hospital Problems    Diagnosis  POA    *Suspected Covid-19 Virus Infection [R68.89]  Yes    Acute respiratory failure with hypoxia [J96.01]  Yes      Resolved Hospital Problems   No resolved problems to display.     Suspected Covid-19 Virus Infection  Person under investigation (PUI) for COVID-19     - COVID-19 testing sent on 3/18 at 1618  - He has what we have thus far identified as a classic history, examination, and lab presentation of COVID19. Suspicion extremely high. Low threshold for crit care  - Infection Control notified     - Isolation:   - Airborne and Droplet Precautions  - N95 masks must be fit tested, wear eye protection  - 20 second hand hygiene              - Limit visitors per hospital policy              - Consolidating lab draws, nursing care, and interventions     - Diagnostics:               - CBC with leukopenia, now Q48H  - CMP with hyponatremia, now Q48H  - Procalcitonin neg  - D-dimer fri AM  - Ferritin 843  - CRP 27.4  -   - BNP 10  - Troponin deferred              - ECG NSR              - rapid Flu neg              -               - Legionella antigen deferred              - Blood culture x2 NGTD              - Portable CXR "Bandlike increased attenuation projected over the bilateral lower lung zones, left greater than right, may be on the basis of shallow inspiratory effort/atelectasis however developing consolidation is not excluded. "              - UA and culture deferred     - Management:              - Supplemental O2 to maintain SpO2 >92%              - Telemetry & Continuous Pulse Ox              - albuterol INHALER PRN (avoid nebulization of secretions)              - Avoiding BiPAP to prevent " aerosolization (including home BiPAP)              - Avoiding NSAIDS for fever per WHO recommendations (3/16/2020)              - Careful use of steroids in the absence of other indications - unless septic shock due to increased viral replication              - Fluid sparing resuscitation              - Empiric antibiotics per likely source & patient allergies                          - CAP: azithromycin & ceftriaxone     Acute respiratory failure with hypoxia   - NO history of pulmonary disease, now requires Oxygen to maintain SpO2 mid 90s  - Suspect viral precipitant  - Continue supportive care  - azithromycin + CTX for bacterial superinfection  - day 5 of 5  - SpO2 continuous     High Risk Conditions:   Patient has a condition that poses threat to life and bodily function: acute hypoxic respiratory failure     Patient's chronic/stable medical conditions noted in the assessment note above will be manage with the patient's home medications as tolerated.      Patient's chronic/stable medical conditions noted in the problem list above will be managed with the patient's home medications as tolerated.         rGegg Barclay M.D.  Attending Physician  [facility] Medicine Dept.  Pager: [phone]  [facility] -x [phone]

## 2020-03-22 NOTE — PLAN OF CARE
Pt AAOx4, VSS, no complaints of pain or nausea. Free of falls and injuries. I/O's maintained per orders. Airborne, droplet and contact precautions maintained per order. Pt refused Lovenox injection. Pt IV access removed per orders. D/C education done per orders. Pt D/C home.   Problem: Adult Inpatient Plan of Care  Goal: Plan of Care Review  Outcome: Ongoing, Progressing  Flowsheets (Taken 3/22/2020 1539)  Plan of Care Reviewed With: patient  Goal: Patient-Specific Goal (Individualization)  Outcome: Ongoing, Progressing  Goal: Absence of Hospital-Acquired Illness or Injury  Outcome: Ongoing, Progressing  Intervention: Identify and Manage Fall Risk  Flowsheets (Taken 3/22/2020 1539)  Safety Promotion/Fall Prevention: side rails raised x 2; bed alarm set; assistive device/personal item within reach; Fall Risk reviewed with patient/family; diversional activities provided; Fall Risk signage in place; medications reviewed; lighting adjusted; nonskid shoes/socks when out of bed  Intervention: Prevent VTE (venous thromboembolism)  Flowsheets (Taken 3/22/2020 1539)  VTE Prevention/Management: ambulation promoted; bleeding precautions maintained; bleeding risk assessed; bleeding risk factor(s) identified, provider notified; fluids promoted  Goal: Optimal Comfort and Wellbeing  Outcome: Ongoing, Progressing  Intervention: Provide Person-Centered Care  Flowsheets (Taken 3/22/2020 1539)  Trust Relationship/Rapport: choices provided; emotional support provided; empathic listening provided; care explained; thoughts/feelings acknowledged; questions answered; questions encouraged; reassurance provided  Goal: Readiness for Transition of Care  Outcome: Ongoing, Progressing  Goal: Rounds/Family Conference  Outcome: Ongoing, Progressing     Problem: Infection  Goal: Infection Symptom Resolution  Outcome: Ongoing, Progressing  Intervention: Prevent or Manage Infection  Flowsheets (Taken 3/22/2020 1539)  Fever Reduction/Comfort Measures:  lightweight clothing; lightweight bedding  Infection Management: aseptic technique maintained  Isolation Precautions: airborne precautions maintained; contact precautions maintained; droplet precautions maintained; protective environment maintained     Problem: Fall Injury Risk  Goal: Absence of Fall and Fall-Related Injury  Outcome: Ongoing, Progressing  Intervention: Identify and Manage Contributors to Fall Injury Risk  Flowsheets (Taken 3/22/2020 1539)  Self-Care Promotion: independence encouraged; BADL personal objects within reach; BADL personal routines maintained  Medication Review/Management: medications reviewed  Intervention: Promote Injury-Free Environment  Flowsheets (Taken 3/22/2020 1539)  Safety Promotion/Fall Prevention: side rails raised x 2; bed alarm set; assistive device/personal item within reach; Fall Risk reviewed with patient/family; diversional activities provided; Fall Risk signage in place; medications reviewed; lighting adjusted; nonskid shoes/socks when out of bed  Environmental Safety Modification: assistive device/personal items within reach; clutter free environment maintained; lighting adjusted; room organization consistent

## 2020-03-23 NOTE — DISCHARGE SUMMARY
"Subjective:      Too Singh is a 14 y.o. male who presents with Finger Pain (L thumb swelling, possibly jammed thumb yesterday)            HPI  States playing around with friend yesterday and \"jammed my finger\". Ice and Tylenol last night. Got home today this morning and placed ice. Increased swelling at pain at left thumb joints. No previous finger injury. Denies numbness/tingling.     PMH:  has no past medical history of ASTHMA or Diabetes.  MEDS:   Current Outpatient Medications:   •  adapalene (DIFFERIN) 0.1 % cream, 1 thin layer TOP QHS to acne (Patient not taking: Reported on 10/6/2019), Disp: 1 Tube, Rfl: 3  •  Ibuprofen (MOTRIN PO), Take  by mouth., Disp: , Rfl:   ALLERGIES: No Known Allergies  SURGHX: No past surgical history on file.  SOCHX:  reports that he has never smoked. He has never used smokeless tobacco. He reports that he does not drink alcohol or use drugs.  FH: Family history was reviewed, no pertinent findings to report    Review of Systems   Constitutional: Negative for chills, fever and malaise/fatigue.   Musculoskeletal: Positive for joint pain and myalgias. Negative for falls.   Skin: Negative for itching and rash.   Neurological: Negative for tingling, sensory change and weakness.   Endo/Heme/Allergies: Does not bruise/bleed easily.   All other systems reviewed and are negative.         Objective:     Pulse 66   Temp 36.9 °C (98.4 °F) (Temporal)   Resp 20   Wt 60.7 kg (133 lb 12.8 oz)   SpO2 100%      Physical Exam   Constitutional: He is oriented to person, place, and time. Vital signs are normal. He appears well-developed and well-nourished. He is active and cooperative.  Non-toxic appearance. He does not have a sickly appearance. He does not appear ill. No distress.   HENT:   Head: Normocephalic.   Eyes: Pupils are equal, round, and reactive to light. EOM are normal.   Cardiovascular: Normal rate.   Pulmonary/Chest: Effort normal.   Musculoskeletal: He exhibits edema and " [facility] Hospital Medicine  Discharge Summary      Patient Name: Marina Caban  MRN: [id number]  Admission Date: 3/17/2020  Hospital Length of Stay: 5 days  Discharge Date and Time: 3/22/2020  6:19 PM  Attending Physician: MD Gregg Walker MD  Discharging Provider: Gregg Barclay MD  Primary Care Provider: Primary Doctor [facility] Medicine Team: [facility] HOSP MED A Gregg Barclay MD    HPI:   Mr. Caban is a 50-year-old man with no medical history. He was in his usual state of health until one week ago when developed a dry cough. Shortly after he became dyspneic, worst with cough and exertion. He's been intermittently febrile, for which OTC remedies have been unsuccessful.      He works at [employer] and his boss has been confirmed positive for COVID. Recently they have worked in close proximity.     No CP or GI symptoms. On examination he initially required 4L of O2 to maintain SpO2 in 90s but was quickly weaned to 2. He has most of the ancillary lab manifestations of COVID19.     On admission we discussed the trajectory of decompensating COVID patients, including mechanical ventilation, with which he was in agreement. CXR with band-like increased attenuation bilaterally.       * No surgery found *      Hospital Course:   Patient admitted for concern of Suspected COVID-19 infection, patient with features consistent with historical findings, exam findings and lab presentation consistent with COVID19.  He had hypoxic respiratory failure on admission that through his admission was able to be weaned to room air and was on room air for >24 hours prior to discharge.     Isolation measures taken while inpatient.  His fever curve also improved prior to discharge.   [facility],     COVID test from 3/18 is still pending at time of discharge.   Workup for alternative infection - blood cultures, and rapid  flu were negative.  He was treated with 5 days of empiric Ceftriaxone and Azithromycin for empiric community acquired pneumonia coverage.       Consults:     Final Active Diagnoses:    Diagnosis Date Noted POA    PRINCIPAL PROBLEM:  Suspected Covid-19 Virus Infection [R68.89] 03/17/2020 Yes    Abnormal liver enzymes [R74.8] 03/22/2020 Yes    Hyponatremia [E87.1] 03/22/2020 Yes      Problems Resolved During this Admission:    Diagnosis Date Noted Date Resolved POA    Acute respiratory failure with hypoxia [J96.01] 03/18/2020 03/22/2020 Yes      Discharged Condition: stable    Disposition: Home or Self Care    Follow Up:  Follow-up Information     [facility] - Internal Medicine.    Specialty:  Internal Medicine  Why:  I HAVE MADE A REFERRAL TO INTERNAL MEDICINE CLINIC YOU SHOULD BE CONTACTED WITH FURTHER INFORMATION  Contact information:  [address]  [facility] [address] [phone]  Additional information:  [facility] Bld[facility].               Patient Instructions:      Ambulatory referral/consult to Internal Medicine   Standing Status: Future   Referral Priority: Routine Referral Type: Consultation   Referral Reason: Specialty Services Required   Requested Specialty: Internal Medicine   Number of Visits Requested: 1     Notify your health care provider if you experience any of the following:  temperature >100.4     Notify your health care provider if you experience any of the following:  persistent nausea and vomiting or diarrhea     Notify your health care provider if you experience any of the following:  severe uncontrolled pain     Notify your health care provider if you experience any of the following:  difficulty breathing or increased cough     Notify your health care provider if you experience any of the following:  severe persistent headache     Notify your health care provider if you experience any of the following:  persistent dizziness, light-headedness,  tenderness. He exhibits no deformity.        Left hand: He exhibits decreased range of motion, tenderness, bony tenderness, disruption of two-point discrimination and swelling. He exhibits normal capillary refill, no deformity and no laceration. Normal sensation noted. Decreased strength noted. He exhibits finger abduction.   Neurological: He is alert and oriented to person, place, and time.   Skin: Skin is warm and dry. No rash noted. He is not diaphoretic. No erythema.   Vitals reviewed.            There is a Salter-Harper type II fracture at the base of the left 1st proximal phalanx.    MA applied finger splint to left thumb    Assessment/Plan:     1. Injury of left thumb, initial encounter    - DX-FINGER(S) 2+ LEFT; Future  - REFERRAL TO SPORTS MEDICINE    2. Fracture of proximal phalanx of finger of left hand    - REFERRAL TO SPORTS MEDICINE    May use NSAID prn for pain/swelling  May use cool compresses for swelling prn  May utilize RICE method prn  Avoid excessive weight bearing to avoid further injury  May apply topical analgesics prn  Perform proper body mechanics with lifting, twisting, bending and walking  Monitor for deformity, numbness/tingling in toes, decreased ROM with weight bearing- need re-evaluation  F/u Sports Med         or visual disturbances     Notify your health care provider if you experience any of the following:  increased confusion or weakness     Activity as tolerated     Medications:  Reconciled Home Medications:      Medication List      START taking these medications    acetaminophen 325 MG tablet  Commonly known as:  TYLENOL  Take 2 tablets (650 mg total) by mouth every 8 (eight) hours as needed for Temperature greater than (101).     albuterol 90 mcg/actuation inhaler  Commonly known as:  PROVENTIL/VENTOLIN HFA  Inhale 2 puffs into the lungs every 6 (six) hours as needed for Wheezing. Rescue        CONTINUE taking these medications    ondansetron 4 MG Tbdl  Commonly known as:  ZOFRAN-ODT  Take 1 tablet (4 mg total) by mouth every 8 (eight) hours as needed (nausea/vomit).        STOP taking these medications    dicyclomine 20 mg tablet  Commonly known as:  BENTYL     docusate sodium 100 MG capsule  Commonly known as:  COLACE     oxyCODONE-acetaminophen 5-325 mg per tablet  Commonly known as:  PERCOCET            Significant Diagnostic Studies: Labs:   CMP   Recent Labs   Lab 03/22/20  0402   *   K 4.1      CO2 22*   GLU 97   BUN 11   CREATININE 1.0   CALCIUM 8.5*   PROT 6.6   ALBUMIN 3.0*   BILITOT 0.6   ALKPHOS 53*   *   *   ANIONGAP 11   ESTGFRAFRICA >60.0   EGFRNONAA >60.0   , CBC   Recent Labs   Lab 03/22/20  0402   WBC 2.68*   HGB 15.4   HCT 46.6      , INR No results found for: INR, PROTIME, Lipid Panel   Results for ANGELA PENA (MRN [id number]) as of 3/22/2020 23:41   Ref. Range 3/18/2020 04:53 3/20/2020 04:34 3/22/2020 04:02   WBC Latest Ref Range: 3.90 - 12.70 K/uL 2.68 (L) 2.45 (L) 2.68 (L)   RBC Latest Ref Range: 4.60 - 6.20 M/uL 4.98 4.72 4.97   Hemoglobin Latest Ref Range: 14.0 - 18.0 g/dL 15.4 15.0 15.4   Hematocrit Latest Ref Range: 40.0 - 54.0 % 47.2 45.2 46.6   MCV Latest Ref Range: 82 - 98 fL 95 96 94   MCH Latest Ref Range: 27.0 - 31.0 pg 30.9 31.8 (H) 31.0   MCHC  Latest Ref Range: 32.0 - 36.0 g/dL 32.6 33.2 33.0   RDW Latest Ref Range: 11.5 - 14.5 % 12.6 12.2 12.2   Platelets Latest Ref Range: 150 - 350 K/uL 112 (L) 155 238   MPV Latest Ref Range: 9.2 - 12.9 fL 10.2 9.9 9.4   Gran% Latest Ref Range: 38.0 - 73.0 % 47.3 55.6 54.9   Gran # (ANC) Latest Ref Range: 1.8 - 7.7 K/uL 1.3 (L) 1.4 (L) 1.5 (L)   Lymph% Latest Ref Range: 18.0 - 48.0 % 40.7 37.1 33.6   Lymph # Latest Ref Range: 1.0 - 4.8 K/uL 1.1 0.9 (L) 0.9 (L)   Mono% Latest Ref Range: 4.0 - 15.0 % 11.2 6.1 9.7   Mono # Latest Ref Range: 0.3 - 1.0 K/uL 0.3 0.2 (L) 0.3   Eosinophil% Latest Ref Range: 0.0 - 8.0 % 0.0 0.0 0.7     Results for ANGELA PENA (MRN [id number]) as of 3/22/2020 23:41   Ref. Range 3/18/2020 04:53 3/20/2020 04:34 3/22/2020 04:02   Sodium Latest Ref Range: 136 - 145 mmol/L 131 (L) 132 (L) 133 (L)   Potassium Latest Ref Range: 3.5 - 5.1 mmol/L 4.4 5.1 4.1   Chloride Latest Ref Range: 95 - 110 mmol/L 97 99 100   CO2 Latest Ref Range: 23 - 29 mmol/L 24 24 22 (L)   Anion Gap Latest Ref Range: 8 - 16 mmol/L 10 9 11   BUN, Bld Latest Ref Range: 6 - 20 mg/dL 15 14 11   Creatinine Latest Ref Range: 0.5 - 1.4 mg/dL 1.4 1.1 1.0   eGFR if non African American Latest Ref Range: >60 mL/min/1.73 m^2 58.2 (A) >60.0 >60.0   eGFR if African American Latest Ref Range: >60 mL/min/1.73 m^2 >60.0 >60.0 >60.0   Glucose Latest Ref Range: 70 - 110 mg/dL 97 94 97   Calcium Latest Ref Range: 8.7 - 10.5 mg/dL 8.3 (L) 7.9 (L) 8.5 (L)   Alkaline Phosphatase Latest Ref Range: 55 - 135 U/L 51 (L) 45 (L) 53 (L)   PROTEIN TOTAL Latest Ref Range: 6.0 - 8.4 g/dL 7.1 6.5 6.6   Albumin Latest Ref Range: 3.5 - 5.2 g/dL 3.4 (L) 2.9 (L) 3.0 (L)   BILIRUBIN TOTAL Latest Ref Range: 0.1 - 1.0 mg/dL 0.6 0.6 0.6   AST Latest Ref Range: 10 - 40 U/L 68 (H) 72 (H) 123 (H)   ALT Latest Ref Range: 10 - 44 U/L 53 (H) 67 (H) 148 (H)   CRP Latest Ref Range: 0.0 - 8.2 mg/L   73.4 (H)     Results for ANGELA PENA (MRN [id number]) as of 3/22/2020 23:41    Ref. Range 3/18/2020 04:53   Ferritin Latest Ref Range: 20.0 - 300.0 ng/mL 843 (H)   Sed Rate Latest Ref Range: 0 - 23 mm/Hr 27 (H)     Results for ANGELA PENA (MRN [id number]) as of 3/22/2020 23:41   Ref. Range 3/20/2020 04:34   D-Dimer Latest Ref Range: <0.50 mg/L FEU 0.77 (H)   Results for ANGELA PENA (MRN [id number]) as of 3/22/2020 23:41   Ref. Range 3/18/2020 04:53   BNP Latest Ref Range: 0 - 99 pg/mL 10   LD Latest Ref Range: 110 - 260 U/L 414 (H)       Lab Results   Component Value Date    CHOL 157 01/22/2009    HDL 58 01/22/2009    LDLCALC 88.2 01/22/2009    TRIG 54 01/22/2009    CHOLHDL 36.9 01/22/2009   , Troponin No results for input(s): TROPONINI in the last 168 hours. and A1C: No results for input(s): HGBA1C in the last 4320 hours.  Radiology: X-Ray: CXR: X-Ray Chest 1 View (CXR): No results found for this visit on 03/17/20. and X-Ray Chest PA and Lateral (CXR): No results found for this visit on 03/17/20.    Pending Diagnostic Studies:     Procedure Component Value Units Date/Time    SARS- CoV-2 (COVID-19) QUALITATIVE PCR [[id number]] Collected:  03/17/20 1618    Order Status:  Sent Lab Status:  In process Updated:  03/17/20 1738    Specimen:  Nasopharyngeal         Indwelling Lines/Drains at time of discharge:   Lines/Drains/Airways     None                 Time spent on the discharge of patient: 36 minutes  Patient was seen and examined on the date of discharge and determined to be suitable for discharge.         Gregg Barclay MD  Department of Hospital Medicine  [facility]

## 2020-03-25 ENCOUNTER — TELEPHONE (OUTPATIENT)
Dept: HEPATOLOGY | Facility: CLINIC | Age: 50
End: 2020-03-25

## 2020-03-25 NOTE — TELEPHONE ENCOUNTER
Patient called requesting COVID test results which are still in process.  Patient continues to follow discharge instruction and protocols.  He will call back in two days if he has not been contacted with results.

## 2020-03-27 LAB — SARS-COV-2 RNA RESP QL NAA+PROBE: DETECTED

## 2020-03-31 ENCOUNTER — PATIENT MESSAGE (OUTPATIENT)
Dept: INTERNAL MEDICINE | Facility: CLINIC | Age: 50
End: 2020-03-31

## 2020-03-31 ENCOUNTER — OFFICE VISIT (OUTPATIENT)
Dept: INTERNAL MEDICINE | Facility: CLINIC | Age: 50
End: 2020-03-31
Payer: COMMERCIAL

## 2020-03-31 ENCOUNTER — TELEPHONE (OUTPATIENT)
Dept: INTERNAL MEDICINE | Facility: CLINIC | Age: 50
End: 2020-03-31

## 2020-03-31 DIAGNOSIS — U07.1 ACUTE BRONCHITIS DUE TO COVID-19 VIRUS: Primary | ICD-10-CM

## 2020-03-31 DIAGNOSIS — R09.02 HYPOXIA: ICD-10-CM

## 2020-03-31 DIAGNOSIS — J20.8 ACUTE BRONCHITIS DUE TO COVID-19 VIRUS: Primary | ICD-10-CM

## 2020-03-31 PROCEDURE — 99213 PR OFFICE/OUTPT VISIT, EST, LEVL III, 20-29 MIN: ICD-10-PCS | Mod: 95,,, | Performed by: INTERNAL MEDICINE

## 2020-03-31 PROCEDURE — 99213 OFFICE O/P EST LOW 20 MIN: CPT | Mod: 95,,, | Performed by: INTERNAL MEDICINE

## 2020-03-31 NOTE — PROGRESS NOTES
"Subjective:       Patient ID: Marina Caban is a 50 y.o. male.    Chief Complaint: No chief complaint on file.    HPI   The patient location is: [place], [place]  The chief complaint leading to consultation is: Post hospitalization for CV 19  Visit type: Virtual visit with synchronous audio and video  Total time spent with patient: 5 minutes  Each patient to whom he or she provides medical services by telemedicine is:  (1) informed of the relationship between the physician and patient and the respective role of any other health care provider with respect to management of the patient; and (2) notified that he or she may decline to receive medical services by telemedicine and may withdraw from such care at any time.    Pt here via video chat s/p hospitalization from 3/17/20-3/22/20 for a dry cough a/w hypoxia. He was found to be positive for CV 19. Pt did not require mechanical ventilation. Per records, "  Patient admitted for concern of Suspected COVID-19 infection, patient with features consistent with historical findings, exam findings and lab presentation consistent with COVID19.  He had hypoxic respiratory failure on admission that through his admission was able to be weaned to room air and was on room air for >24 hours prior to discharge." Pt was found to be CV 10 positive.     Since discharge pt is feeling much better. Minimal cough without any fevers/chills, SOB.        Review of Systems   Constitutional: Positive for fatigue. Negative for activity change, appetite change, chills, diaphoresis, fever and unexpected weight change.   HENT: Negative for postnasal drip, rhinorrhea, sinus pressure, sneezing, sore throat, trouble swallowing and voice change.    Respiratory: Positive for cough. Negative for shortness of breath and wheezing.    Cardiovascular: Negative for chest pain, palpitations and leg swelling.   Gastrointestinal: Negative for abdominal pain, blood in stool, constipation, diarrhea, nausea and " vomiting.   Genitourinary: Negative for dysuria.   Musculoskeletal: Negative for arthralgias and myalgias.   Skin: Negative for rash and wound.   Allergic/Immunologic: Negative for environmental allergies and food allergies.   Hematological: Negative for adenopathy. Does not bruise/bleed easily.       Objective:      Physical Exam   Constitutional: He is oriented to person, place, and time. He appears well-developed and well-nourished. No distress.   Pulmonary/Chest:   No respiratory distress noted on exam    Neurological: He is alert and oriented to person, place, and time.   Skin: He is not diaphoretic.       Assessment:       1. Acute bronchitis due to COVID-19 virus    2. Hypoxia        Plan:    1/2. Pt with significant improvement in his respiratory status. He is not yet back to baseline but is 80% better. Will give work note to return to work tentatively next Monday if he is back to baseline. ED precautions discussed with pt.

## 2020-03-31 NOTE — TELEPHONE ENCOUNTER
Pt seen in video visit today(he was + CV 19)- may return to work next Monday if he is completely back to normal. May give work note

## 2020-04-06 ENCOUNTER — PATIENT MESSAGE (OUTPATIENT)
Dept: INTERNAL MEDICINE | Facility: CLINIC | Age: 50
End: 2020-04-06

## 2020-04-06 ENCOUNTER — TELEPHONE (OUTPATIENT)
Dept: INTERNAL MEDICINE | Facility: CLINIC | Age: 50
End: 2020-04-06

## 2020-04-06 ENCOUNTER — OFFICE VISIT (OUTPATIENT)
Dept: INTERNAL MEDICINE | Facility: CLINIC | Age: 50
End: 2020-04-06
Payer: COMMERCIAL

## 2020-04-06 DIAGNOSIS — J40 BRONCHITIS DUE TO COVID-19 VIRUS: Primary | ICD-10-CM

## 2020-04-06 DIAGNOSIS — U07.1 BRONCHITIS DUE TO COVID-19 VIRUS: Primary | ICD-10-CM

## 2020-04-06 PROCEDURE — 99213 PR OFFICE/OUTPT VISIT, EST, LEVL III, 20-29 MIN: ICD-10-PCS | Mod: 95,,, | Performed by: INTERNAL MEDICINE

## 2020-04-06 PROCEDURE — 99213 OFFICE O/P EST LOW 20 MIN: CPT | Mod: 95,,, | Performed by: INTERNAL MEDICINE

## 2020-04-06 NOTE — LETTER
April 6, 2020    Marina Caban  [address] [place] [address]             [employer] [facility]  [address]  [place] [address]  Phone: [phone]  Fax: [phone] To Whom It May Concern:    Re:Mr. Marina Caban has been under my care at this time, due to Bronchitis   and exposure to COVID 19 dated 3-. We are advising him to stay home to avoid close contact.     We ask that you excuse him from work 2-3 weeks. He has been cleared to return to work on April 13, 2020      Sincerely,    Phuc Goodman DO  [facility]

## 2020-04-06 NOTE — PROGRESS NOTES
Subjective:       Patient ID: Marina Caban is a 50 y.o. male.    Chief Complaint: No chief complaint on file.    HPI   The patient location is: [place], [place]  The chief complaint leading to consultation is: Cough, CV 19 +  Visit type: Virtual visit with synchronous audio and video  Total time spent with patient: 5 minutes   Each patient to whom he or she provides medical services by telemedicine is:  (1) informed of the relationship between the physician and patient and the respective role of any other health care provider with respect to management of the patient; and (2) notified that he or she may decline to receive medical services by telemedicine and may withdraw from such care at any time.    Pt here for f/u regarding CV 19 infection. He has continued to improve overall but still with mild cough. No fevers/chills. He wonders when he can go back to work.     Review of Systems   Constitutional: Negative for activity change, appetite change, chills, diaphoresis, fatigue, fever and unexpected weight change.   HENT: Negative for postnasal drip, rhinorrhea, sinus pressure, sneezing, sore throat, trouble swallowing and voice change.    Respiratory: Positive for cough. Negative for shortness of breath and wheezing.    Cardiovascular: Negative for chest pain, palpitations and leg swelling.   Gastrointestinal: Negative for abdominal pain, blood in stool, constipation, diarrhea, nausea and vomiting.   Genitourinary: Negative for dysuria.   Musculoskeletal: Negative for arthralgias and myalgias.   Skin: Negative for rash and wound.   Allergic/Immunologic: Negative for environmental allergies and food allergies.   Hematological: Negative for adenopathy. Does not bruise/bleed easily.       Objective:      Physical Exam   Constitutional: He is oriented to person, place, and time. He appears well-developed and well-nourished. No distress.   Pulmonary/Chest: No respiratory distress.   Neurological: He is alert and  oriented to person, place, and time.   Skin: He is not diaphoretic.       Assessment:       1. Bronchitis due to COVID-19 virus        Plan:    1. Symptoms overall are resolving.       Pt may RTW next Monday(work note given)

## 2020-05-15 DIAGNOSIS — Z12.11 COLON CANCER SCREENING: ICD-10-CM

## 2020-10-05 ENCOUNTER — PATIENT MESSAGE (OUTPATIENT)
Dept: ADMINISTRATIVE | Facility: HOSPITAL | Age: 50
End: 2020-10-05

## 2021-01-04 ENCOUNTER — PATIENT MESSAGE (OUTPATIENT)
Dept: ADMINISTRATIVE | Facility: HOSPITAL | Age: 51
End: 2021-01-04

## 2021-04-05 ENCOUNTER — PATIENT MESSAGE (OUTPATIENT)
Dept: ADMINISTRATIVE | Facility: HOSPITAL | Age: 51
End: 2021-04-05

## 2021-04-16 ENCOUNTER — PATIENT MESSAGE (OUTPATIENT)
Dept: RESEARCH | Facility: HOSPITAL | Age: 51
End: 2021-04-16

## 2021-07-06 ENCOUNTER — PATIENT MESSAGE (OUTPATIENT)
Dept: ADMINISTRATIVE | Facility: HOSPITAL | Age: 51
End: 2021-07-06

## 2021-10-04 ENCOUNTER — PATIENT MESSAGE (OUTPATIENT)
Dept: ADMINISTRATIVE | Facility: HOSPITAL | Age: 51
End: 2021-10-04

## 2022-01-18 ENCOUNTER — PATIENT MESSAGE (OUTPATIENT)
Dept: ADMINISTRATIVE | Facility: HOSPITAL | Age: 52
End: 2022-01-18
Payer: COMMERCIAL

## 2022-02-21 ENCOUNTER — PATIENT MESSAGE (OUTPATIENT)
Dept: RESEARCH | Facility: HOSPITAL | Age: 52
End: 2022-02-21
Payer: COMMERCIAL

## 2022-03-16 ENCOUNTER — PATIENT MESSAGE (OUTPATIENT)
Dept: ADMINISTRATIVE | Facility: HOSPITAL | Age: 52
End: 2022-03-16
Payer: COMMERCIAL

## 2022-04-04 ENCOUNTER — PATIENT MESSAGE (OUTPATIENT)
Dept: ADMINISTRATIVE | Facility: HOSPITAL | Age: 52
End: 2022-04-04
Payer: COMMERCIAL

## 2022-06-16 ENCOUNTER — OFFICE VISIT (OUTPATIENT)
Dept: INTERNAL MEDICINE | Facility: CLINIC | Age: 52
End: 2022-06-16
Payer: COMMERCIAL

## 2022-06-16 VITALS
BODY MASS INDEX: 26.61 KG/M2 | HEART RATE: 65 BPM | WEIGHT: 196.44 LBS | OXYGEN SATURATION: 98 % | HEIGHT: 72 IN | RESPIRATION RATE: 20 BRPM | DIASTOLIC BLOOD PRESSURE: 86 MMHG | TEMPERATURE: 98 F | SYSTOLIC BLOOD PRESSURE: 144 MMHG

## 2022-06-16 DIAGNOSIS — Z00.00 ANNUAL PHYSICAL EXAM: Primary | ICD-10-CM

## 2022-06-16 DIAGNOSIS — Z82.49 FAMILY HISTORY OF HEART DISEASE IN MALE FAMILY MEMBER BEFORE AGE 55: ICD-10-CM

## 2022-06-16 DIAGNOSIS — Z13.6 ENCOUNTER FOR SCREENING FOR CARDIOVASCULAR DISORDERS: ICD-10-CM

## 2022-06-16 DIAGNOSIS — Z82.49 FAMILY HISTORY OF HEART DISEASE IN BROTHER: ICD-10-CM

## 2022-06-16 DIAGNOSIS — Z12.11 COLON CANCER SCREENING: ICD-10-CM

## 2022-06-16 DIAGNOSIS — M71.21 BAKER'S CYST OF KNEE, RIGHT: ICD-10-CM

## 2022-06-16 PROCEDURE — 3008F BODY MASS INDEX DOCD: CPT | Mod: CPTII,S$GLB,, | Performed by: INTERNAL MEDICINE

## 2022-06-16 PROCEDURE — 1160F PR REVIEW ALL MEDS BY PRESCRIBER/CLIN PHARMACIST DOCUMENTED: ICD-10-PCS | Mod: CPTII,S$GLB,, | Performed by: INTERNAL MEDICINE

## 2022-06-16 PROCEDURE — 3077F PR MOST RECENT SYSTOLIC BLOOD PRESSURE >= 140 MM HG: ICD-10-PCS | Mod: CPTII,S$GLB,, | Performed by: INTERNAL MEDICINE

## 2022-06-16 PROCEDURE — 1160F RVW MEDS BY RX/DR IN RCRD: CPT | Mod: CPTII,S$GLB,, | Performed by: INTERNAL MEDICINE

## 2022-06-16 PROCEDURE — 3079F DIAST BP 80-89 MM HG: CPT | Mod: CPTII,S$GLB,, | Performed by: INTERNAL MEDICINE

## 2022-06-16 PROCEDURE — 1159F PR MEDICATION LIST DOCUMENTED IN MEDICAL RECORD: ICD-10-PCS | Mod: CPTII,S$GLB,, | Performed by: INTERNAL MEDICINE

## 2022-06-16 PROCEDURE — 99999 PR PBB SHADOW E&M-EST. PATIENT-LVL IV: CPT | Mod: PBBFAC,,, | Performed by: INTERNAL MEDICINE

## 2022-06-16 PROCEDURE — 99396 PREV VISIT EST AGE 40-64: CPT | Mod: S$GLB,,, | Performed by: INTERNAL MEDICINE

## 2022-06-16 PROCEDURE — 99396 PR PREVENTIVE VISIT,EST,40-64: ICD-10-PCS | Mod: S$GLB,,, | Performed by: INTERNAL MEDICINE

## 2022-06-16 PROCEDURE — 3079F PR MOST RECENT DIASTOLIC BLOOD PRESSURE 80-89 MM HG: ICD-10-PCS | Mod: CPTII,S$GLB,, | Performed by: INTERNAL MEDICINE

## 2022-06-16 PROCEDURE — 1159F MED LIST DOCD IN RCRD: CPT | Mod: CPTII,S$GLB,, | Performed by: INTERNAL MEDICINE

## 2022-06-16 PROCEDURE — 99999 PR PBB SHADOW E&M-EST. PATIENT-LVL IV: ICD-10-PCS | Mod: PBBFAC,,, | Performed by: INTERNAL MEDICINE

## 2022-06-16 PROCEDURE — 3077F SYST BP >= 140 MM HG: CPT | Mod: CPTII,S$GLB,, | Performed by: INTERNAL MEDICINE

## 2022-06-16 PROCEDURE — 3008F PR BODY MASS INDEX (BMI) DOCUMENTED: ICD-10-PCS | Mod: CPTII,S$GLB,, | Performed by: INTERNAL MEDICINE

## 2022-06-16 RX ORDER — MELOXICAM 15 MG/1
15 TABLET ORAL DAILY PRN
Qty: 30 TABLET | Refills: 2 | Status: SHIPPED | OUTPATIENT
Start: 2022-06-16 | End: 2022-10-24

## 2022-06-16 NOTE — PROGRESS NOTES
Subjective:       Patient ID: Marina Caban is a 52 y.o. male.    Chief Complaint: Annual Exam    HPI   52 y.o. Male here for annual exam.     Vaccines: Influenza (declined); Tetanus (no)  Eye exam: current  Colonoscopy: needs     Exercise: walks  Diet: regular    History reviewed. No pertinent past medical history.  Past Surgical History:  2003: hernia rpair  Social History    Socioeconomic History      Marital status:       Number of children: 2    Tobacco Use      Smoking status: Never Smoker      Smokeless tobacco: Never Used    Substance and Sexual Activity      Alcohol use: Yes        Comment: social      Drug use: Never      Sexual activity: Yes        Partners: Female    Social History Narrative       at [employer]     Review of patient's allergies indicates:  No Known Allergies  Daniel Caban had no medications administered during this visit.    Review of Systems   Constitutional: Negative for activity change, appetite change, chills, diaphoresis, fatigue, fever and unexpected weight change.   HENT: Negative for nasal congestion, mouth sores, postnasal drip, rhinorrhea, sinus pressure/congestion, sneezing, sore throat, trouble swallowing and voice change.    Eyes: Negative for discharge, itching and visual disturbance.   Respiratory: Negative for cough, chest tightness, shortness of breath and wheezing.    Cardiovascular: Negative for chest pain, palpitations and leg swelling.   Gastrointestinal: Negative for abdominal pain, blood in stool, constipation, diarrhea, nausea and vomiting.   Endocrine: Negative for cold intolerance and heat intolerance.   Genitourinary: Negative for difficulty urinating, dysuria, flank pain, hematuria and urgency.   Musculoskeletal: Negative for arthralgias, back pain, myalgias and neck pain.   Integumentary:  Negative for rash and wound.   Allergic/Immunologic: Negative for environmental allergies and food allergies.   Neurological: Negative for dizziness,  tremors, seizures, syncope, weakness and headaches.   Hematological: Negative for adenopathy. Does not bruise/bleed easily.   Psychiatric/Behavioral: Negative for confusion, sleep disturbance and suicidal ideas. The patient is not nervous/anxious.          Objective:      Physical Exam  Constitutional:       General: He is not in acute distress.     Appearance: He is well-developed. He is not diaphoretic.   HENT:      Head: Normocephalic and atraumatic.      Right Ear: External ear normal.      Left Ear: External ear normal.      Nose: Nose normal.      Mouth/Throat:      Pharynx: No oropharyngeal exudate.   Eyes:      General: No scleral icterus.        Right eye: No discharge.         Left eye: No discharge.      Conjunctiva/sclera: Conjunctivae normal.      Pupils: Pupils are equal, round, and reactive to light.   Neck:      Thyroid: No thyromegaly.      Vascular: No JVD.   Cardiovascular:      Rate and Rhythm: Normal rate and regular rhythm.      Heart sounds: Normal heart sounds. No murmur heard.  Pulmonary:      Effort: Pulmonary effort is normal. No respiratory distress.      Breath sounds: Normal breath sounds. No wheezing or rales.   Abdominal:      General: Bowel sounds are normal. There is no distension.      Palpations: Abdomen is soft.      Tenderness: There is no abdominal tenderness. There is no guarding.   Musculoskeletal:      Cervical back: Normal range of motion and neck supple.      Right lower leg: No edema.      Left lower leg: No edema.        Legs:    Lymphadenopathy:      Cervical: No cervical adenopathy.   Skin:     General: Skin is warm and dry.      Coloration: Skin is not pale.      Findings: No rash.   Neurological:      Mental Status: He is alert and oriented to person, place, and time.   Psychiatric:         Judgment: Judgment normal.         Assessment:       Problem List Items Addressed This Visit        Cardiac/Vascular    Family history of heart disease in male family member  before age 55    Relevant Orders    CT Calcium Scoring Cardiac    Family history of heart disease in brother    Relevant Orders    CT Calcium Scoring Cardiac      Other Visit Diagnoses     Annual physical exam    -  Primary    Relevant Orders    CBC Auto Differential    Comprehensive Metabolic Panel    TSH    Lipid Panel    Urinalysis    PSA, Screening    Hemoglobin A1C    Colon cancer screening        Relevant Orders    Case Request Endoscopy: COLONOSCOPY (Completed)    Encounter for screening for cardiovascular disorders        Relevant Orders    CT Calcium Scoring Cardiac    Oneill's cyst of knee, right        Relevant Medications    meloxicam (MOBIC) 15 MG tablet          Plan:    Blood work ordered     FHx of CAD- CT calcium score ordered     Bakers's cyst- Rx Mobic and Ice PRN

## 2022-06-17 ENCOUNTER — HOSPITAL ENCOUNTER (OUTPATIENT)
Dept: RADIOLOGY | Facility: HOSPITAL | Age: 52
Discharge: HOME | End: 2022-06-17
Attending: INTERNAL MEDICINE
Payer: COMMERCIAL

## 2022-06-17 DIAGNOSIS — Z82.49 FAMILY HISTORY OF HEART DISEASE IN BROTHER: ICD-10-CM

## 2022-06-17 DIAGNOSIS — Z13.6 ENCOUNTER FOR SCREENING FOR CARDIOVASCULAR DISORDERS: ICD-10-CM

## 2022-06-17 DIAGNOSIS — Z82.49 FAMILY HISTORY OF HEART DISEASE IN MALE FAMILY MEMBER BEFORE AGE 55: ICD-10-CM

## 2022-06-17 PROCEDURE — 75571 CT CALCIUM SCORING CARDIAC: ICD-10-PCS | Mod: 26,,, | Performed by: RADIOLOGY

## 2022-06-17 PROCEDURE — 75571 CT HRT W/O DYE W/CA TEST: CPT | Mod: TC

## 2022-06-17 PROCEDURE — 75571 CT HRT W/O DYE W/CA TEST: CPT | Mod: 26,,, | Performed by: RADIOLOGY

## 2022-06-23 ENCOUNTER — LAB VISIT (OUTPATIENT)
Dept: LAB | Facility: HOSPITAL | Age: 52
End: 2022-06-23
Attending: INTERNAL MEDICINE
Payer: COMMERCIAL

## 2022-06-23 DIAGNOSIS — Z00.00 ANNUAL PHYSICAL EXAM: ICD-10-CM

## 2022-06-23 LAB
ALBUMIN SERPL BCP-MCNC: 4 G/DL (ref 3.5–5.2)
ALP SERPL-CCNC: 52 U/L (ref 55–135)
ALT SERPL W/O P-5'-P-CCNC: 27 U/L (ref 10–44)
ANION GAP SERPL CALC-SCNC: 7 MMOL/L (ref 8–16)
AST SERPL-CCNC: 33 U/L (ref 10–40)
BASOPHILS # BLD AUTO: 0.03 K/UL (ref 0–0.2)
BASOPHILS NFR BLD: 0.8 % (ref 0–1.9)
BILIRUB SERPL-MCNC: 1.5 MG/DL (ref 0.1–1)
BUN SERPL-MCNC: 11 MG/DL (ref 6–20)
CALCIUM SERPL-MCNC: 9.4 MG/DL (ref 8.7–10.5)
CHLORIDE SERPL-SCNC: 105 MMOL/L (ref 95–110)
CHOLEST SERPL-MCNC: 195 MG/DL (ref 120–199)
CHOLEST/HDLC SERPL: 3.8 {RATIO} (ref 2–5)
CO2 SERPL-SCNC: 26 MMOL/L (ref 23–29)
COMPLEXED PSA SERPL-MCNC: 0.62 NG/ML (ref 0–4)
CREAT SERPL-MCNC: 1.2 MG/DL (ref 0.5–1.4)
DIFFERENTIAL METHOD: ABNORMAL
EOSINOPHIL # BLD AUTO: 0.2 K/UL (ref 0–0.5)
EOSINOPHIL NFR BLD: 4 % (ref 0–8)
ERYTHROCYTE [DISTWIDTH] IN BLOOD BY AUTOMATED COUNT: 12.5 % (ref 11.5–14.5)
EST. GFR  (AFRICAN AMERICAN): >60 ML/MIN/1.73 M^2
EST. GFR  (NON AFRICAN AMERICAN): >60 ML/MIN/1.73 M^2
ESTIMATED AVG GLUCOSE: 111 MG/DL (ref 68–131)
GLUCOSE SERPL-MCNC: 98 MG/DL (ref 70–110)
HBA1C MFR BLD: 5.5 % (ref 4–5.6)
HCT VFR BLD AUTO: 47.2 % (ref 40–54)
HDLC SERPL-MCNC: 51 MG/DL (ref 40–75)
HDLC SERPL: 26.2 % (ref 20–50)
HGB BLD-MCNC: 16.4 G/DL (ref 14–18)
IMM GRANULOCYTES # BLD AUTO: 0.01 K/UL (ref 0–0.04)
IMM GRANULOCYTES NFR BLD AUTO: 0.3 % (ref 0–0.5)
LDLC SERPL CALC-MCNC: 121 MG/DL (ref 63–159)
LYMPHOCYTES # BLD AUTO: 1.7 K/UL (ref 1–4.8)
LYMPHOCYTES NFR BLD: 45.5 % (ref 18–48)
MCH RBC QN AUTO: 33.4 PG (ref 27–31)
MCHC RBC AUTO-ENTMCNC: 34.7 G/DL (ref 32–36)
MCV RBC AUTO: 96 FL (ref 82–98)
MONOCYTES # BLD AUTO: 0.4 K/UL (ref 0.3–1)
MONOCYTES NFR BLD: 11.5 % (ref 4–15)
NEUTROPHILS # BLD AUTO: 1.4 K/UL (ref 1.8–7.7)
NEUTROPHILS NFR BLD: 37.9 % (ref 38–73)
NONHDLC SERPL-MCNC: 144 MG/DL
NRBC BLD-RTO: 0 /100 WBC
PLATELET # BLD AUTO: 171 K/UL (ref 150–450)
PMV BLD AUTO: 9.5 FL (ref 9.2–12.9)
POTASSIUM SERPL-SCNC: 4.3 MMOL/L (ref 3.5–5.1)
PROT SERPL-MCNC: 7 G/DL (ref 6–8.4)
RBC # BLD AUTO: 4.91 M/UL (ref 4.6–6.2)
SODIUM SERPL-SCNC: 138 MMOL/L (ref 136–145)
TRIGL SERPL-MCNC: 115 MG/DL (ref 30–150)
TSH SERPL DL<=0.005 MIU/L-ACNC: 1.94 UIU/ML (ref 0.4–4)
WBC # BLD AUTO: 3.74 K/UL (ref 3.9–12.7)

## 2022-06-23 PROCEDURE — 84153 ASSAY OF PSA TOTAL: CPT | Performed by: INTERNAL MEDICINE

## 2022-06-23 PROCEDURE — 80053 COMPREHEN METABOLIC PANEL: CPT | Performed by: INTERNAL MEDICINE

## 2022-06-23 PROCEDURE — 80061 LIPID PANEL: CPT | Performed by: INTERNAL MEDICINE

## 2022-06-23 PROCEDURE — 85025 COMPLETE CBC W/AUTO DIFF WBC: CPT | Performed by: INTERNAL MEDICINE

## 2022-06-23 PROCEDURE — 84443 ASSAY THYROID STIM HORMONE: CPT | Performed by: INTERNAL MEDICINE

## 2022-06-23 PROCEDURE — 83036 HEMOGLOBIN GLYCOSYLATED A1C: CPT | Performed by: INTERNAL MEDICINE

## 2022-06-23 PROCEDURE — 36415 COLL VENOUS BLD VENIPUNCTURE: CPT | Performed by: INTERNAL MEDICINE

## 2022-10-19 NOTE — ED NOTES
"FAZAL Toribio stated that room was not clean even though room is marked as "bed ready."  "
97 % on 3L  
Bed: 11  Expected date:   Expected time:   Means of arrival:   Comments:  eduardo Wren  
Notified resp of inhaler/spacer education needed.   
Patient identifiers verified and correct for Herberthloni Caban.    LOC: The patient is awake, alert and aware of environment with an appropriate affect, the patient is oriented x 3 and speaking appropriately.  APPEARANCE: Patient resting comfortably and in no acute distress, patient is clean and well groomed, patient's clothing is properly fastened.  SKIN: The skin is hot to the touch, color consistent with ethnicity, patient has normal skin turgor and moist mucus membranes, skin intact, no breakdown or bruising noted.  MUSCULOSKELETAL: Patient moving all extremities spontaneously, no obvious swelling or deformities noted.  RESPIRATORY: Airway is open and patent, respirations are spontaneous, abdominal muscle use noted, expiratory wheezes noted on 4L NC and sats at 95%.  CARDIAC: Patient has a normal rate and regular rhythm, no periphreal edema noted, capillary refill < 3 seconds.  ABDOMEN: Soft and non tender to palpation, no distention noted, normoactive bowel sounds present in all four quadrants.  NEUROLOGIC: Eyes open spontaneously, behavior appropriate to situation, follows commands    
Patient reports his boss tested positive for COVID 19  
Pt care assumed. Report received by FAZAL Beaver. Pt lying in stretcher in low and locked position and side rails raised x2. Call light, pt's belongings, and bedside table within pt's reach. Pt on continuous cardiac monitoring, pulse oximetry, and BP cycling every 30 minutes. Pt in NAD and verbalized no needs at this time. Pt currently on COVID-19 precautions.   
Pt diaphoretic, placed on oxygen 3L  
Pt provided water pitcher.   
Pt transported from ED room 11 to [facility] via transport. Pt wearing mask during time of transport.   
Report called to FAZAL Marcano   
War room confirmed tele box: [id number]  Rhythm  Normal sinus rhythm   Rate  72 bpm     
War room reports that tele box that was applied to pt is not working.   
---

## 2022-10-28 DIAGNOSIS — Z12.11 ENCOUNTER FOR SCREENING COLONOSCOPY FOR NON-HIGH-RISK PATIENT: Primary | ICD-10-CM

## 2023-11-11 ENCOUNTER — HOSPITAL ENCOUNTER (EMERGENCY)
Facility: HOSPITAL | Age: 53
Discharge: HOME | End: 2023-11-11
Attending: EMERGENCY MEDICINE
Payer: COMMERCIAL

## 2023-11-11 VITALS
DIASTOLIC BLOOD PRESSURE: 100 MMHG | TEMPERATURE: 98 F | SYSTOLIC BLOOD PRESSURE: 166 MMHG | HEART RATE: 62 BPM | BODY MASS INDEX: 26.16 KG/M2 | WEIGHT: 192.88 LBS | OXYGEN SATURATION: 98 % | RESPIRATION RATE: 16 BRPM

## 2023-11-11 DIAGNOSIS — M43.6 TORTICOLLIS: ICD-10-CM

## 2023-11-11 DIAGNOSIS — M54.2 NECK PAIN: Primary | ICD-10-CM

## 2023-11-11 PROCEDURE — 99284 EMERGENCY DEPT VISIT MOD MDM: CPT

## 2023-11-11 PROCEDURE — 63600175 PHARM REV CODE 636 W HCPCS: Performed by: EMERGENCY MEDICINE

## 2023-11-11 PROCEDURE — 96372 THER/PROPH/DIAG INJ SC/IM: CPT | Performed by: EMERGENCY MEDICINE

## 2023-11-11 RX ORDER — CYCLOBENZAPRINE HCL 10 MG
10 TABLET ORAL 3 TIMES DAILY PRN
Qty: 30 TABLET | Refills: 0 | Status: SHIPPED | OUTPATIENT
Start: 2023-11-11 | End: 2023-11-21

## 2023-11-11 RX ORDER — KETOROLAC TROMETHAMINE 30 MG/ML
30 INJECTION, SOLUTION INTRAMUSCULAR; INTRAVENOUS
Status: COMPLETED | OUTPATIENT
Start: 2023-11-11 | End: 2023-11-11

## 2023-11-11 RX ADMIN — KETOROLAC TROMETHAMINE 30 MG: 30 INJECTION INTRAMUSCULAR; INTRAVENOUS at 06:11
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
## 2023-11-11 NOTE — Clinical Note
"Marina Caban (Rico) was seen and treated in our emergency department on 11/11/2023.  He may return to work on 11/16/2023.       If you have any questions or concerns, please don't hesitate to call.      Ariel Lloyd MD"

## 2023-12-14 NOTE — TELEPHONE ENCOUNTER
Assisted patient to schedule a virtual visit today. He is down loading the [facility] . [facility]g margret. Patient will call to schedule appt.  
No

## 2024-01-30 ENCOUNTER — HOSPITAL ENCOUNTER (EMERGENCY)
Facility: HOSPITAL | Age: 54
Discharge: HOME | End: 2024-01-30
Attending: EMERGENCY MEDICINE
Payer: COMMERCIAL

## 2024-01-30 VITALS
RESPIRATION RATE: 16 BRPM | OXYGEN SATURATION: 98 % | SYSTOLIC BLOOD PRESSURE: 156 MMHG | TEMPERATURE: 98 F | DIASTOLIC BLOOD PRESSURE: 88 MMHG | HEART RATE: 87 BPM

## 2024-01-30 DIAGNOSIS — S60.221A CONTUSION OF RIGHT HAND, INITIAL ENCOUNTER: ICD-10-CM

## 2024-01-30 PROCEDURE — 99283 EMERGENCY DEPT VISIT LOW MDM: CPT
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
## 2024-03-21 ENCOUNTER — PATIENT OUTREACH (OUTPATIENT)
Dept: ADMINISTRATIVE | Facility: HOSPITAL | Age: 54
End: 2024-03-21
Payer: COMMERCIAL

## 2024-03-21 NOTE — PROGRESS NOTES
Chart review done.  updated. Immunizations reviewed & updated. Care Everywhere updated. Letter mailed to patient to schedule PCP visit.

## 2024-06-18 ENCOUNTER — PATIENT OUTREACH (OUTPATIENT)
Dept: ADMINISTRATIVE | Facility: HOSPITAL | Age: 54
End: 2024-06-18
Payer: COMMERCIAL

## 2024-06-18 NOTE — PROGRESS NOTES
Population Health Chart Review & Patient Outreach Details      Additional [facility] Health Notes:               Updates Requested / Reviewed:      Care Everywhere, , and Immunizations Reconciliation Completed or Queried: [place]         Health Maintenance Topics Overdue:      [facility] Score: 1     Colon Cancer Screening                       Health Maintenance Topic(s) Outreach Outcomes & Actions Taken:    Primary Care Appt - Outreach Outcomes & Actions Taken  : Primary Care Appt Scheduled

## 2024-06-21 ENCOUNTER — OFFICE VISIT (OUTPATIENT)
Dept: INTERNAL MEDICINE | Facility: CLINIC | Age: 54
End: 2024-06-21
Payer: COMMERCIAL

## 2024-06-21 VITALS
DIASTOLIC BLOOD PRESSURE: 82 MMHG | OXYGEN SATURATION: 98 % | HEART RATE: 64 BPM | SYSTOLIC BLOOD PRESSURE: 143 MMHG | RESPIRATION RATE: 20 BRPM | BODY MASS INDEX: 26.51 KG/M2 | HEIGHT: 72 IN | WEIGHT: 195.75 LBS

## 2024-06-21 DIAGNOSIS — R91.1 LUNG NODULE: ICD-10-CM

## 2024-06-21 DIAGNOSIS — R22.43 LOCALIZED SWELLING OF BOTH LOWER LEGS: ICD-10-CM

## 2024-06-21 DIAGNOSIS — Z12.11 COLON CANCER SCREENING: ICD-10-CM

## 2024-06-21 DIAGNOSIS — R06.02 SOB (SHORTNESS OF BREATH) ON EXERTION: ICD-10-CM

## 2024-06-21 DIAGNOSIS — Z00.00 ANNUAL PHYSICAL EXAM: Primary | ICD-10-CM

## 2024-06-21 PROCEDURE — 99999 PR PBB SHADOW E&M-EST. PATIENT-LVL IV: CPT | Mod: PBBFAC,,, | Performed by: INTERNAL MEDICINE

## 2024-06-21 NOTE — PROGRESS NOTES
Subjective     Patient ID: Marina Caban is a 54 y.o. male.    Chief Complaint: Annual Exam    HPI  54 y.o. Male here for annual exam. Pt c/o around 6 wks of recurrent SOB. It seems to be related to activity only and not at rest. He has also developed some mild B/L LE edema in the same time frame. Pt has noticed at times he is SOB at night while in bed and per wife, he snores. No additional cardiac symptoms related to it, hx of Asthma/COPD/CHF, tobacco use.  He had a normal CT calcium score back in 2022. + FH of CAD.  Has never been tested for SERAFIN.      Vaccines: Influenza (declined); Tetanus (no)  Eye exam: current  Colonoscopy: needs      Exercise: walks  Diet: regular     History reviewed. No pertinent past medical history.  Past Surgical History:  2003: hernia rpair  Social History    Socioeconomic History      Marital status:       Number of children: 2    Tobacco Use      Smoking status: Never Smoker      Smokeless tobacco: Never Used    Substance and Sexual Activity      Alcohol use: Yes        Comment: social      Drug use: Never      Sexual activity: Yes        Partners: Female    Social History Narrative       at [employer]      Review of patient's allergies indicates:  No Known Allergies  Review of Systems   Constitutional:  Negative for activity change, appetite change, chills, diaphoresis, fatigue, fever and unexpected weight change.   HENT:  Negative for nasal congestion, mouth sores, postnasal drip, rhinorrhea, sinus pressure/congestion, sneezing, sore throat, trouble swallowing and voice change.    Eyes:  Negative for discharge, itching and visual disturbance.   Respiratory:  Positive for shortness of breath. Negative for cough, chest tightness and wheezing.    Cardiovascular:  Positive for leg swelling. Negative for chest pain and palpitations.   Gastrointestinal:  Negative for abdominal pain, blood in stool, constipation, diarrhea, nausea and vomiting.   Endocrine: Negative for  cold intolerance and heat intolerance.   Genitourinary:  Negative for difficulty urinating, dysuria, flank pain, hematuria and urgency.   Musculoskeletal:  Negative for arthralgias, back pain, myalgias and neck pain.   Integumentary:  Negative for rash and wound.   Allergic/Immunologic: Negative for environmental allergies and food allergies.   Neurological:  Negative for dizziness, tremors, seizures, syncope, weakness and headaches.   Hematological:  Negative for adenopathy. Does not bruise/bleed easily.   Psychiatric/Behavioral:  Negative for confusion, sleep disturbance and suicidal ideas. The patient is not nervous/anxious.           Objective     Physical Exam  Constitutional:       General: He is not in acute distress.     Appearance: Normal appearance. He is well-developed. He is not ill-appearing, toxic-appearing or diaphoretic.   HENT:      Head: Normocephalic and atraumatic.      Right Ear: External ear normal.      Left Ear: External ear normal.      Nose: Nose normal.      Mouth/Throat:      Pharynx: No oropharyngeal exudate.   Eyes:      General: No scleral icterus.        Right eye: No discharge.         Left eye: No discharge.      Extraocular Movements: Extraocular movements intact.      Conjunctiva/sclera: Conjunctivae normal.      Pupils: Pupils are equal, round, and reactive to light.   Neck:      Thyroid: No thyromegaly.      Vascular: No JVD.   Cardiovascular:      Rate and Rhythm: Normal rate and regular rhythm.      Pulses: Normal pulses.      Heart sounds: Normal heart sounds. No murmur heard.  Pulmonary:      Effort: Pulmonary effort is normal. No respiratory distress.      Breath sounds: Normal breath sounds. No wheezing or rales.   Abdominal:      General: Bowel sounds are normal. There is no distension.      Palpations: Abdomen is soft.      Tenderness: There is no abdominal tenderness. There is no right CVA tenderness, left CVA tenderness, guarding or rebound.   Musculoskeletal:       Cervical back: Normal range of motion and neck supple. No rigidity.      Right lower leg: Edema (trace) present.      Left lower leg: Edema (trace) present.   Lymphadenopathy:      Cervical: No cervical adenopathy.   Skin:     General: Skin is warm and dry.      Capillary Refill: Capillary refill takes less than 2 seconds.      Coloration: Skin is not pale.      Findings: No rash.   Neurological:      General: No focal deficit present.      Mental Status: He is alert and oriented to person, place, and time. Mental status is at baseline.      Cranial Nerves: No cranial nerve deficit.      Sensory: No sensory deficit.      Motor: No weakness.      Coordination: Coordination normal.      Gait: Gait normal.      Deep Tendon Reflexes: Reflexes normal.   Psychiatric:         Mood and Affect: Mood normal.         Behavior: Behavior normal.         Thought Content: Thought content normal.         Judgment: Judgment normal.            Assessment and Plan     1. Annual physical exam  -     CBC Auto Differential; Future; Expected date: 06/21/2024  -     Comprehensive Metabolic Panel; Future; Expected date: 06/21/2024  -     Lipid Panel; Future; Expected date: 06/21/2024  -     Urinalysis; Future  -     PSA, Screening; Future; Expected date: 06/21/2024  -     Hemoglobin A1C; Future; Expected date: 06/21/2024    2. Lung nodule  -     CT Chest Without Contrast; Future; Expected date: 06/21/2024    3. SOB (shortness of breath) on exertion  -     BNP; Future; Expected date: 06/21/2024  -     EKG 12-lead; Future; Expected date: 06/21/2024  -     US Lower Extremity Veins Bilateral; Future; Expected date: 06/21/2024    4. Localized swelling of both lower legs  -     US Lower Extremity Veins Bilateral; Future; Expected date: 06/21/2024    5. Colon cancer screening  -     Ambulatory referral/consult to Endo Procedure ; Future; Expected date: 06/22/2024        Blood work ordered     Lung nodule- CT chest ordered    SOB with  exertion- check ECG/labs    Leg edema- STAT B/L LE US to r/o DVT    Snoring- referral to sleep medicine to r/o SERAFIN

## 2024-06-22 ENCOUNTER — LAB VISIT (OUTPATIENT)
Dept: LAB | Facility: HOSPITAL | Age: 54
End: 2024-06-22
Attending: INTERNAL MEDICINE
Payer: COMMERCIAL

## 2024-06-22 DIAGNOSIS — Z00.00 ANNUAL PHYSICAL EXAM: ICD-10-CM

## 2024-06-22 LAB
BILIRUB UR QL STRIP: NEGATIVE
CLARITY UR REFRACT.AUTO: CLEAR
COLOR UR AUTO: YELLOW
GLUCOSE UR QL STRIP: NEGATIVE
HGB UR QL STRIP: NEGATIVE
KETONES UR QL STRIP: NEGATIVE
LEUKOCYTE ESTERASE UR QL STRIP: NEGATIVE
NITRITE UR QL STRIP: NEGATIVE
PH UR STRIP: 7 [PH] (ref 5–8)
PROT UR QL STRIP: NEGATIVE
SP GR UR STRIP: 1.02 (ref 1–1.03)
URN SPEC COLLECT METH UR: NORMAL

## 2024-06-22 PROCEDURE — 81003 URINALYSIS AUTO W/O SCOPE: CPT | Performed by: INTERNAL MEDICINE

## 2024-07-02 ENCOUNTER — HOSPITAL ENCOUNTER (OUTPATIENT)
Dept: RADIOLOGY | Facility: HOSPITAL | Age: 54
Discharge: HOME | End: 2024-07-02
Attending: INTERNAL MEDICINE
Payer: COMMERCIAL

## 2024-07-02 DIAGNOSIS — R06.02 SOB (SHORTNESS OF BREATH) ON EXERTION: ICD-10-CM

## 2024-07-02 DIAGNOSIS — R91.1 LUNG NODULE: ICD-10-CM

## 2024-07-02 DIAGNOSIS — R22.43 LOCALIZED SWELLING OF BOTH LOWER LEGS: ICD-10-CM

## 2024-07-02 PROCEDURE — 71250 CT THORAX DX C-: CPT | Mod: 26,,, | Performed by: RADIOLOGY

## 2024-07-02 PROCEDURE — 93970 EXTREMITY STUDY: CPT | Mod: TC,PO

## 2024-07-02 PROCEDURE — 71250 CT THORAX DX C-: CPT | Mod: TC,PO

## 2024-07-02 PROCEDURE — 93970 EXTREMITY STUDY: CPT | Mod: 26,,, | Performed by: RADIOLOGY

## 2024-09-03 ENCOUNTER — CLINICAL SUPPORT (OUTPATIENT)
Dept: ENDOSCOPY | Facility: HOSPITAL | Age: 54
End: 2024-09-03
Attending: INTERNAL MEDICINE
Payer: COMMERCIAL

## 2024-09-03 ENCOUNTER — TELEPHONE (OUTPATIENT)
Dept: ENDOSCOPY | Facility: HOSPITAL | Age: 54
End: 2024-09-03

## 2024-09-03 VITALS — HEIGHT: 72 IN | BODY MASS INDEX: 26.28 KG/M2 | WEIGHT: 194 LBS

## 2024-09-03 DIAGNOSIS — Z12.11 COLON CANCER SCREENING: ICD-10-CM

## 2024-09-03 RX ORDER — SODIUM, POTASSIUM,MAG SULFATES 17.5-3.13G
1 SOLUTION, RECONSTITUTED, ORAL ORAL DAILY
Qty: 1 KIT | Refills: 0 | Status: SHIPPED | OUTPATIENT
Start: 2024-09-03 | End: 2024-09-05

## 2024-09-03 NOTE — TELEPHONE ENCOUNTER
Spoke to pt to schedule procedure(s) Colonoscopy       Physician to perform procedure(s) Dr. WALTER Mccloud  Date of Procedure (s) 10/29/24  Arrival Time 9:00 AM  Time of Procedure(s) 10:10 AM   Location of Procedure(s) [place] 2nd Floor  Type of Rx Prep sent to patient: Suprep  Instructions provided to patient via [facility]    Patient was informed on the following information and verbalized understanding. Screening questionnaire reviewed with patient and complete. If procedure requires anesthesia, a responsible adult needs to be present to accompany the patient home, patient cannot drive after receiving anesthesia. Appointment details are tentative, especially check-in time. Patient will receive a prep-op call 7 days prior to confirm check-in time for procedure. If applicable the patient should contact their pharmacy to verify Rx for procedure prep is ready for pick-up. Patient was advised to call the scheduling department at [phone] if pharmacy states no Rx is available. Patient was advised to call the endoscopy scheduling department if any questions or concerns arise.      [facility] Scheduling Department

## 2024-10-01 ENCOUNTER — PATIENT MESSAGE (OUTPATIENT)
Dept: INTERNAL MEDICINE | Facility: CLINIC | Age: 54
End: 2024-10-01
Payer: COMMERCIAL

## 2024-10-23 ENCOUNTER — TELEPHONE (OUTPATIENT)
Dept: ENDOSCOPY | Facility: HOSPITAL | Age: 54
End: 2024-10-23
Payer: COMMERCIAL

## 2024-10-23 NOTE — TELEPHONE ENCOUNTER
Spoke to patient for pre-call to confirm scheduled Colonoscopy and patient verbalized understanding of the following:       Date & arrival time of procedure(s) verified 10/29/24, 9:10 AM.  Location of procedure(s) [place] [address] Floor verified.  NPO status reinforced. Ok to continue clear liquids until 8:10 AM.   Patient denies use of blood thinners, GLP-1 medications, and weight loss medications.  Patient confirmed receipt of prep instructions and Rx prep.  Instructions provided to patient via [facility].  Patient confirmed ride home after procedure if procedure requires anesthesia.   Pre-call screening questionnaire reviewed and completed with patient.   Appointment details are tentative, including check-in time.  If the patient begins taking any blood thinning medications, injectable weight loss/diabetes medications (other than insulin), or Adipex (phentermine) patient was instructed to contact the endoscopy scheduling department as soon as possible.  Patient was advised to call the endoscopy scheduling department if any questions or concerns arise.      Endoscopy Scheduling Department

## 2024-10-25 ENCOUNTER — ANESTHESIA EVENT (OUTPATIENT)
Dept: ENDOSCOPY | Facility: HOSPITAL | Age: 54
End: 2024-10-25
Payer: COMMERCIAL

## 2024-10-29 ENCOUNTER — HOSPITAL ENCOUNTER (OUTPATIENT)
Facility: HOSPITAL | Age: 54
Discharge: HOME | End: 2024-10-29
Attending: SURGERY | Admitting: SURGERY
Payer: COMMERCIAL

## 2024-10-29 ENCOUNTER — ANESTHESIA (OUTPATIENT)
Dept: ENDOSCOPY | Facility: HOSPITAL | Age: 54
End: 2024-10-29
Payer: COMMERCIAL

## 2024-10-29 VITALS
OXYGEN SATURATION: 96 % | HEART RATE: 68 BPM | DIASTOLIC BLOOD PRESSURE: 75 MMHG | BODY MASS INDEX: 25.73 KG/M2 | WEIGHT: 190 LBS | HEIGHT: 72 IN | RESPIRATION RATE: 18 BRPM | TEMPERATURE: 98 F | SYSTOLIC BLOOD PRESSURE: 123 MMHG

## 2024-10-29 DIAGNOSIS — Z12.11 ENCOUNTER FOR SCREENING COLONOSCOPY: ICD-10-CM

## 2024-10-29 PROCEDURE — 88305 TISSUE EXAM BY PATHOLOGIST: CPT | Mod: 26,,, | Performed by: STUDENT IN AN ORGANIZED HEALTH CARE EDUCATION/TRAINING PROGRAM

## 2024-10-29 PROCEDURE — 63600175 PHARM REV CODE 636 W HCPCS: Performed by: NURSE ANESTHETIST, CERTIFIED REGISTERED

## 2024-10-29 PROCEDURE — 37000008 HC ANESTHESIA 1ST 15 MINUTES: Performed by: SURGERY

## 2024-10-29 PROCEDURE — A4216 STERILE WATER/SALINE, 10 ML: HCPCS | Performed by: NURSE ANESTHETIST, CERTIFIED REGISTERED

## 2024-10-29 PROCEDURE — 45380 COLONOSCOPY AND BIOPSY: CPT | Mod: 33 | Performed by: SURGERY

## 2024-10-29 PROCEDURE — 88305 TISSUE EXAM BY PATHOLOGIST: CPT | Performed by: STUDENT IN AN ORGANIZED HEALTH CARE EDUCATION/TRAINING PROGRAM

## 2024-10-29 PROCEDURE — 37000009 HC ANESTHESIA EA ADD 15 MINS: Performed by: SURGERY

## 2024-10-29 PROCEDURE — 25000003 PHARM REV CODE 250: Performed by: NURSE ANESTHETIST, CERTIFIED REGISTERED

## 2024-10-29 PROCEDURE — 99900035 HC TECH TIME PER 15 MIN (STAT)

## 2024-10-29 PROCEDURE — 94761 N-INVAS EAR/PLS OXIMETRY MLT: CPT

## 2024-10-29 PROCEDURE — 45380 COLONOSCOPY AND BIOPSY: CPT | Mod: 33,,, | Performed by: SURGERY

## 2024-10-29 PROCEDURE — 27201012 HC FORCEPS, HOT/COLD, DISP: Performed by: SURGERY

## 2024-10-29 RX ORDER — PROPOFOL 10 MG/ML
VIAL (ML) INTRAVENOUS CONTINUOUS PRN
Status: DISCONTINUED | OUTPATIENT
Start: 2024-10-29 | End: 2024-10-29

## 2024-10-29 RX ORDER — LIDOCAINE HYDROCHLORIDE 20 MG/ML
INJECTION INTRAVENOUS
Status: DISCONTINUED | OUTPATIENT
Start: 2024-10-29 | End: 2024-10-29

## 2024-10-29 RX ORDER — SODIUM CHLORIDE 0.9 % (FLUSH) 0.9 %
SYRINGE (ML) INJECTION
Status: DISCONTINUED | OUTPATIENT
Start: 2024-10-29 | End: 2024-10-29

## 2024-10-29 RX ORDER — PROPOFOL 10 MG/ML
VIAL (ML) INTRAVENOUS
Status: DISCONTINUED | OUTPATIENT
Start: 2024-10-29 | End: 2024-10-29

## 2024-10-29 RX ORDER — SODIUM CHLORIDE 9 MG/ML
INJECTION, SOLUTION INTRAVENOUS CONTINUOUS
Status: DISCONTINUED | OUTPATIENT
Start: 2024-10-29 | End: 2024-10-29 | Stop reason: HOSPADM

## 2024-10-29 RX ADMIN — PROPOFOL 175 MCG/KG/MIN: 10 INJECTION, EMULSION INTRAVENOUS at 10:10

## 2024-10-29 RX ADMIN — PROPOFOL 100 MG: 10 INJECTION, EMULSION INTRAVENOUS at 10:10

## 2024-10-29 RX ADMIN — Medication 8 ML: at 10:10

## 2024-10-29 RX ADMIN — Medication 10 ML: at 10:10

## 2024-10-29 RX ADMIN — LIDOCAINE HYDROCHLORIDE 50 MG: 20 INJECTION INTRAVENOUS at 10:10

## 2024-10-31 LAB
FINAL PATHOLOGIC DIAGNOSIS: NORMAL
GROSS: NORMAL
Lab: NORMAL

## 2024-11-13 ENCOUNTER — PATIENT MESSAGE (OUTPATIENT)
Dept: RESEARCH | Facility: HOSPITAL | Age: 54
End: 2024-11-13
Payer: COMMERCIAL

## 2025-03-06 ENCOUNTER — OFFICE VISIT (OUTPATIENT)
Dept: INTERNAL MEDICINE | Facility: CLINIC | Age: 55
End: 2025-03-06
Payer: COMMERCIAL

## 2025-03-06 ENCOUNTER — HOSPITAL ENCOUNTER (OUTPATIENT)
Dept: RADIOLOGY | Facility: HOSPITAL | Age: 55
Discharge: HOME | End: 2025-03-06
Attending: NURSE PRACTITIONER
Payer: COMMERCIAL

## 2025-03-06 ENCOUNTER — RESULTS FOLLOW-UP (OUTPATIENT)
Dept: INTERNAL MEDICINE | Facility: CLINIC | Age: 55
End: 2025-03-06

## 2025-03-06 VITALS
SYSTOLIC BLOOD PRESSURE: 130 MMHG | HEART RATE: 66 BPM | OXYGEN SATURATION: 98 % | HEIGHT: 72 IN | DIASTOLIC BLOOD PRESSURE: 80 MMHG | BODY MASS INDEX: 28.37 KG/M2 | WEIGHT: 209.44 LBS

## 2025-03-06 DIAGNOSIS — M25.562 ACUTE PAIN OF LEFT KNEE: ICD-10-CM

## 2025-03-06 DIAGNOSIS — M25.562 ACUTE PAIN OF LEFT KNEE: Primary | ICD-10-CM

## 2025-03-06 DIAGNOSIS — R06.02 SOB (SHORTNESS OF BREATH) ON EXERTION: ICD-10-CM

## 2025-03-06 DIAGNOSIS — R60.0 BILATERAL LOWER EXTREMITY EDEMA: ICD-10-CM

## 2025-03-06 PROCEDURE — 73562 X-RAY EXAM OF KNEE 3: CPT | Mod: 26,LT,, | Performed by: RADIOLOGY

## 2025-03-06 PROCEDURE — 1159F MED LIST DOCD IN RCRD: CPT | Mod: CPTII,S$GLB,, | Performed by: NURSE PRACTITIONER

## 2025-03-06 PROCEDURE — 3075F SYST BP GE 130 - 139MM HG: CPT | Mod: CPTII,S$GLB,, | Performed by: NURSE PRACTITIONER

## 2025-03-06 PROCEDURE — 3079F DIAST BP 80-89 MM HG: CPT | Mod: CPTII,S$GLB,, | Performed by: NURSE PRACTITIONER

## 2025-03-06 PROCEDURE — 99214 OFFICE O/P EST MOD 30 MIN: CPT | Mod: S$GLB,,, | Performed by: NURSE PRACTITIONER

## 2025-03-06 PROCEDURE — 3008F BODY MASS INDEX DOCD: CPT | Mod: CPTII,S$GLB,, | Performed by: NURSE PRACTITIONER

## 2025-03-06 PROCEDURE — 1160F RVW MEDS BY RX/DR IN RCRD: CPT | Mod: CPTII,S$GLB,, | Performed by: NURSE PRACTITIONER

## 2025-03-06 PROCEDURE — 99999 PR PBB SHADOW E&M-EST. PATIENT-LVL III: CPT | Mod: PBBFAC,,, | Performed by: NURSE PRACTITIONER

## 2025-03-06 PROCEDURE — 73562 X-RAY EXAM OF KNEE 3: CPT | Mod: TC,PO,LT

## 2025-03-06 RX ORDER — IBUPROFEN 800 MG/1
800 TABLET ORAL 3 TIMES DAILY PRN
Qty: 90 TABLET | Refills: 0 | Status: SHIPPED | OUTPATIENT
Start: 2025-03-06

## 2025-03-06 NOTE — PROGRESS NOTES
Subjective:       Patient ID: Marina Caban is a 55 y.o. male.    Chief Complaint: Knee Pain    History of Present Illness    CHIEF COMPLAINT:  Patient presents today for left knee swelling and pain.    MUSCULOSKELETAL - LEFT KNEE:  He reports left knee swelling for the past 4 days with severe exacerbation yesterday, affecting his ability to walk and work. He describes the knee feeling heavy, as if dragging a bag, with buckling and weakness when attempting to stand. He had a similar swelling episode earlier this year. He denies any prior knee injuries or previous knee imaging studies. He took ibuprofen (2 tablets) which provided pain relief.    RESPIRATORY:  He reports intermittent breathing difficulties, requiring him to sleep on his sides. He endorses snoring. 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    DIET:  He reports consuming a diet high in salt and fried foods.    FAMILY HISTORY:  One brother  from heart failure in December/November. Another brother recently passed away due to ? Lung cancer.    ROS:  Respiratory: +shortness of breath  Musculoskeletal: +joint pain, +joint swelling, +muscle weakness     Objective:      Physical Exam  Vitals reviewed.   Constitutional:       Appearance: Normal appearance.   HENT:      Head: Normocephalic and atraumatic.      Nose: Nose normal.      Mouth/Throat:      Mouth: Mucous membranes are moist.   Eyes:      Pupils: Pupils are equal, round, and reactive to light.   Cardiovascular:      Rate and Rhythm: Normal rate and regular rhythm.      Pulses:           Dorsalis pedis pulses are 2+ on the right side and 2+ on the left side.        Posterior tibial pulses are 2+ on the right side and 2+ on the left side.      Heart sounds: Normal heart sounds.   Pulmonary:      Effort: Pulmonary effort is normal.      Breath sounds: Normal breath sounds.   Abdominal:       General: Bowel sounds are normal.      Palpations: Abdomen is soft.   Musculoskeletal:         General: Normal range of motion.      Cervical back: Normal range of motion.      Right lower le+ Edema present.      Left lower le+ Edema present.   Skin:     General: Skin is warm and dry.   Neurological:      General: No focal deficit present.      Mental Status: He is alert and oriented to person, place, and time.   Psychiatric:         Mood and Affect: Mood normal.         Behavior: Behavior normal.         Assessment:       1. Acute pain of left knee  - X-Ray Knee 3 View Left; Future    2. Bilateral lower extremity edema  - Comprehensive Metabolic Panel; Future  - TSH; Future  - Echo; Future    3. SOB (shortness of breath) on exertion  - Comprehensive Metabolic Panel; Future  - TSH; Future  - Echo; Future  - BNP; Future      Plan:       Assessment & Plan    IMPRESSION:  Suspect tendonitis in knee based on patient's presentation and reported symptoms  Considering possibility of fluid behind knee or arthritis due to sudden onset of pain  Investigating potential cardiac issues due to leg swelling, shortness of breath, and family history of heart failure  Evaluating kidney and liver function, as well as thyroid, to rule out other causes of swelling  Monitoring weight fluctuations to assess fluid retention    KNEE PAIN AND SWELLING:  - Prescribed ibuprofen 800 mg 3 times daily as needed, to be taken with food.  - Instructed the patient to take the medication daily for the next 3 days, then as needed thereafter.  - Ordered an x-ray of the left knee.  - Patient reports knee swelling for the last few days, with severe pain yesterday affecting ability to work and walk.  - Patient experiences buckling and weakness in the knee when trying to stand up.  - Observed swelling in the left knee and tenderness on exam at the medial and lateral aspect of the patella.   - Suspect tendonitis and ordered an x-ray to rule out  fracture and check for fluid behind the knee and arthritis.  - Recommend obtaining a knee brace with a knee hole and Velcro straps for compression and stabilization.    SHORTNESS OF BREATH:  - Patient reports sometimes having trouble breathing and experiencing shortness of breath during activities like walking and bending over similar to his episode while putting his shoes and socks back on during visit.   - Observed audible breathing when patient is putting on shoes.  - Plan to investigate shortness of breath further due to concerns about heart function.  - Ordered an echocardiogram to evaluate heart function in relation to shortness of breath.    SLEEP ISSUES:  - Patient reports snoring and having to turn to their side to breathe while sleeping.  - Patient reports feeling tired all the time.  - Inquired about stopping breathing during sleep, but patient's wife has not reported this.    FLUID RETENTION AND SWELLING:  - Explained the connection between salt intake and fluid retention.  - Discussed the importance of fluid restriction if heart function is compromised.  - Advised to limit fluid intake to 1-1.5 L per day.  - Instructed to monitor weight daily, naked, first thing in the morning after urinating.  - Advised to report weight fluctuations of 3-5 lbs per week to the office.  - Instructed to contact the office if weight increases by 3 lbs in a week or 5 lbs in a month.  - Patient reports swelling in the legs and high salt intake.  - Monitor weight daily, naked, first thing in the morning after urinating.  - Report weight fluctuations of 3-5 lbs per week to the office.  - Contact the office if weight increases by 3 lbs in a week or 5 lbs in a month.    LABS:  - Ordered a metabolic panel.  - Ordered a thyroi  d function test.  - Ordered BNP (Brain Natriuretic Peptide) test.  - Plan to check kidney function, liver function, and thyroid function through labs.    CARDIAC EVALUATION:  - Ordered an echocardiogram.  -  Patient reports family history of heart failure in two brothers.  - Acknowledged family history of heart failure and plan further cardiac evaluation.    FAMILY HISTORY:  - Patient reports family history of colon cancer in a brother.    FOLLOW UP:  - Advised to follow up after test results are reviewed to determine if return visit is needed.       This note was generated with the assistance of ambient listening technology. Verbal consent was obtained by the patient and accompanying visitor(s) for the recording of patient appointment to facilitate this note. I attest to having reviewed and edited the generated note for accuracy, though some syntax or spelling errors may persist. Please contact the author of this note for any clarification.       Answers submitted by the patient for this visit:  Review of Systems Questionnaire (Submitted on 3/5/2025)  activity change: No  unexpected weight change: No  neck pain: No  hearing loss: No  rhinorrhea: No  trouble swallowing: No  eye discharge: No  visual disturbance: No  chest tightness: No  wheezing: No  chest pain: No  palpitations: No  blood in stool: No  constipation: No  vomiting: No  diarrhea: No  polydipsia: No  polyuria: No  difficulty urinating: No  urgency: No  hematuria: No  joint swelling: Yes  arthralgias: Yes  headaches: No  weakness: No  confusion: No  dysphoric mood: No

## 2025-03-25 ENCOUNTER — HOSPITAL ENCOUNTER (OUTPATIENT)
Dept: CARDIOLOGY | Facility: HOSPITAL | Age: 55
Discharge: HOME | End: 2025-03-25
Attending: NURSE PRACTITIONER
Payer: COMMERCIAL

## 2025-03-25 VITALS
HEIGHT: 72 IN | BODY MASS INDEX: 28.31 KG/M2 | SYSTOLIC BLOOD PRESSURE: 140 MMHG | WEIGHT: 209 LBS | HEART RATE: 52 BPM | DIASTOLIC BLOOD PRESSURE: 80 MMHG

## 2025-03-25 DIAGNOSIS — R60.0 BILATERAL LOWER EXTREMITY EDEMA: ICD-10-CM

## 2025-03-25 DIAGNOSIS — R06.02 SOB (SHORTNESS OF BREATH) ON EXERTION: ICD-10-CM

## 2025-03-25 LAB
ASCENDING AORTA: 3.24 CM
AV AREA BY CONTINUOUS VTI: 2.4 CM2
AV INDEX (PROSTH): 0.73
AV LVOT MEAN GRADIENT: 3 MMHG
AV LVOT PEAK GRADIENT: 4 MMHG
AV MEAN GRADIENT: 5 MMHG
AV PEAK GRADIENT: 9 MMHG
AV VALVE AREA BY VELOCITY RATIO: 2.3 CM²
AV VALVE AREA: 2.5 CM²
AV VELOCITY RATIO: 0.67
BSA FOR ECHO PROCEDURE: 2.19 M2
CV ECHO LV RWT: 0.34 CM
DOP CALC AO PEAK VEL: 1.5 M/S
DOP CALC AO VTI: 33.7 CM
DOP CALC LVOT AREA: 3.5 CM2
DOP CALC LVOT DIAMETER: 2.1 CM
DOP CALC LVOT PEAK VEL: 1 M/S
DOP CALC LVOT STROKE VOLUME: 85.5 CM3
DOP CALCLVOT PEAK VEL VTI: 24.7 CM
E WAVE DECELERATION TIME: 221 MSEC
E/A RATIO: 1.3
E/E' RATIO: 10 M/S
ECHO EF ESTIMATED: 76 %
ECHO LV POSTERIOR WALL: 0.8 CM (ref 0.6–1.1)
EJECTION FRACTION: 65 %
FRACTIONAL SHORTENING: 44.7 % (ref 28–44)
INTERVENTRICULAR SEPTUM: 1.2 CM (ref 0.6–1.1)
LA MAJOR: 6.2 CM
LA MINOR: 6.5 CM
LA WIDTH: 3.7 CM
LEFT ATRIUM SIZE: 3.6 CM
LEFT ATRIUM VOLUME INDEX MOD: 24 ML/M2
LEFT ATRIUM VOLUME INDEX: 33 ML/M2
LEFT ATRIUM VOLUME MOD: 52 ML
LEFT ATRIUM VOLUME: 72 CM3
LEFT INTERNAL DIMENSION IN SYSTOLE: 2.6 CM (ref 2.1–4)
LEFT VENTRICLE DIASTOLIC VOLUME INDEX: 47.93 ML/M2
LEFT VENTRICLE DIASTOLIC VOLUME: 104 ML
LEFT VENTRICLE MASS INDEX: 75.8 G/M2
LEFT VENTRICLE SYSTOLIC VOLUME INDEX: 11.5 ML/M2
LEFT VENTRICLE SYSTOLIC VOLUME: 25 ML
LEFT VENTRICULAR INTERNAL DIMENSION IN DIASTOLE: 4.7 CM (ref 3.5–6)
LEFT VENTRICULAR MASS: 164.5 G
LV LATERAL E/E' RATIO: 9.6 M/S
LV SEPTAL E/E' RATIO: 9.6 M/S
MV A" WAVE DURATION": 16.27 MSEC
MV PEAK A VEL: 0.66 M/S
MV PEAK E VEL: 0.86 M/S
MV STENOSIS PRESSURE HALF TIME: 64.13 MS
MV VALVE AREA P 1/2 METHOD: 3.43 CM2
OHS CV RV/LV RATIO: 0.62 CM
PISA TR MAX VEL: 2.8 M/S
PULM VEIN A" WAVE DURATION": 162.7 MS
PULM VEIN S/D RATIO: 1.05
PULMONIC VEIN PEAK A VELOCITY: 0.3 M/S
PV PEAK D VEL: 0.41 M/S
PV PEAK S VEL: 0.43 M/S
RA MAJOR: 5.54 CM
RA PRESSURE ESTIMATED: 3 MMHG
RA WIDTH: 3.37 CM
RIGHT ATRIAL AREA: 10.5 CM2
RIGHT ATRIAL AREA: 18.8 CM2
RIGHT VENTRICLE DIASTOLIC BASEL DIMENSION: 2.9 CM
RV TB RVSP: 6 MMHG
SINUS: 3.14 CM
STJ: 2.62 CM
TDI LATERAL: 0.09 M/S
TDI SEPTAL: 0.09 M/S
TDI: 0.09 M/S
TRICUSPID ANNULAR PLANE SYSTOLIC EXCURSION: 2.16 CM
TV PEAK GRADIENT: 30 MMHG
TV REST PULMONARY ARTERY PRESSURE: 34 MMHG
Z-SCORE OF LEFT VENTRICULAR DIMENSION IN END DIASTOLE: -4.24
Z-SCORE OF LEFT VENTRICULAR DIMENSION IN END SYSTOLE: -4.11

## 2025-03-25 PROCEDURE — 93306 TTE W/DOPPLER COMPLETE: CPT | Mod: 26,,, | Performed by: INTERNAL MEDICINE

## 2025-03-25 PROCEDURE — 93306 TTE W/DOPPLER COMPLETE: CPT | Mod: PO

## 2025-05-14 ENCOUNTER — OFFICE VISIT (OUTPATIENT)
Dept: INTERNAL MEDICINE | Facility: CLINIC | Age: 55
End: 2025-05-14
Payer: COMMERCIAL

## 2025-05-14 ENCOUNTER — OFFICE VISIT (OUTPATIENT)
Dept: ORTHOPEDICS | Facility: CLINIC | Age: 55
End: 2025-05-14
Payer: COMMERCIAL

## 2025-05-14 VITALS
HEIGHT: 72 IN | OXYGEN SATURATION: 97 % | DIASTOLIC BLOOD PRESSURE: 90 MMHG | HEART RATE: 84 BPM | BODY MASS INDEX: 27.25 KG/M2 | WEIGHT: 201.19 LBS | RESPIRATION RATE: 18 BRPM | TEMPERATURE: 97 F | SYSTOLIC BLOOD PRESSURE: 146 MMHG

## 2025-05-14 VITALS — HEIGHT: 72 IN | WEIGHT: 201.25 LBS | BODY MASS INDEX: 27.26 KG/M2

## 2025-05-14 DIAGNOSIS — M25.562 ACUTE PAIN OF LEFT KNEE: Primary | ICD-10-CM

## 2025-05-14 DIAGNOSIS — M25.562 ACUTE PAIN OF LEFT KNEE: ICD-10-CM

## 2025-05-14 DIAGNOSIS — M23.204 OLD TEAR OF MEDIAL MENISCUS OF LEFT KNEE, UNSPECIFIED TEAR TYPE: ICD-10-CM

## 2025-05-14 DIAGNOSIS — M25.462 EFFUSION, LEFT KNEE: Primary | ICD-10-CM

## 2025-05-14 DIAGNOSIS — I10 HYPERTENSION, UNSPECIFIED TYPE: ICD-10-CM

## 2025-05-14 DIAGNOSIS — R60.0 BILATERAL LOWER EXTREMITY EDEMA: ICD-10-CM

## 2025-05-14 PROCEDURE — 20611 DRAIN/INJ JOINT/BURSA W/US: CPT | Mod: LT,S$GLB,, | Performed by: FAMILY MEDICINE

## 2025-05-14 PROCEDURE — 1159F MED LIST DOCD IN RCRD: CPT | Mod: CPTII,S$GLB,, | Performed by: FAMILY MEDICINE

## 2025-05-14 PROCEDURE — 3008F BODY MASS INDEX DOCD: CPT | Mod: CPTII,S$GLB,, | Performed by: FAMILY MEDICINE

## 2025-05-14 PROCEDURE — 99999 PR PBB SHADOW E&M-EST. PATIENT-LVL III: CPT | Mod: PBBFAC,,, | Performed by: FAMILY MEDICINE

## 2025-05-14 PROCEDURE — 99999 PR PBB SHADOW E&M-EST. PATIENT-LVL IV: CPT | Mod: PBBFAC,,, | Performed by: NURSE PRACTITIONER

## 2025-05-14 PROCEDURE — 99204 OFFICE O/P NEW MOD 45 MIN: CPT | Mod: 25,S$GLB,, | Performed by: FAMILY MEDICINE

## 2025-05-14 RX ORDER — HYDROCHLOROTHIAZIDE 12.5 MG/1
12.5 TABLET ORAL DAILY
Qty: 30 TABLET | Refills: 11 | Status: SHIPPED | OUTPATIENT
Start: 2025-05-14 | End: 2026-05-14

## 2025-05-14 RX ORDER — TRIAMCINOLONE ACETONIDE 40 MG/ML
40 INJECTION, SUSPENSION INTRA-ARTICULAR; INTRAMUSCULAR
Status: DISCONTINUED | OUTPATIENT
Start: 2025-05-14 | End: 2025-05-14 | Stop reason: HOSPADM

## 2025-05-14 RX ADMIN — TRIAMCINOLONE ACETONIDE 40 MG: 40 INJECTION, SUSPENSION INTRA-ARTICULAR; INTRAMUSCULAR at 02:05

## 2025-05-14 NOTE — PROGRESS NOTES
Subjective:       Patient ID: Marina Caban is a 55 y.o. male.    Chief Complaint: Knee Pain    History of Present Illness    CHIEF COMPLAINT:  Patient presents today for follow up of knee pain    KNEE PAIN AND FUNCTION:  He reports severe knee pain significantly impacting daily activities, including difficulty getting up to use the bathroom and climbing stairs. He experiences a sensation of locking on one side of the knee, particularly during exercises or stretching. The knee remains swollen. He uses a knee brace which provides some support and applies hot towels for relief. He takes ibuprofen twice daily with some pain relief. He denies pain in the leg itself.    LOWER EXTREMITY SWELLING:  He reports unilateral leg and foot swelling that worsens throughout the day, with notable asymmetry compared to the contralateral side.    HYPERTENSION:  He reports elevated blood pressure and is monitoring salt intake.    ROS:  Respiratory: -shortness of breath  Cardiovascular: -chest pain, +lower extremity edema  Musculoskeletal: +joint pain, +joint swelling, +difficulty standing up, +pain with movement       Objective:      Physical Exam  Vitals reviewed.   Constitutional:       Appearance: Normal appearance.   HENT:      Head: Normocephalic and atraumatic.      Nose: Nose normal.      Mouth/Throat:      Mouth: Mucous membranes are moist.   Eyes:      Pupils: Pupils are equal, round, and reactive to light.   Cardiovascular:      Rate and Rhythm: Normal rate and regular rhythm.      Heart sounds: Normal heart sounds.   Pulmonary:      Effort: Pulmonary effort is normal.      Breath sounds: Normal breath sounds.   Abdominal:      General: Abdomen is flat. Bowel sounds are normal.      Palpations: Abdomen is soft.   Musculoskeletal:      Cervical back: Normal range of motion.      Left knee: Decreased range of motion. Tenderness present over the MCL and PCL.      Right lower le+ Edema present.      Left lower le+  Edema present.   Skin:     General: Skin is warm and dry.   Neurological:      General: No focal deficit present.      Mental Status: He is alert and oriented to person, place, and time.   Psychiatric:         Mood and Affect: Mood normal.         Behavior: Behavior normal.         Assessment:       1. Acute pain of left knee  - Ambulatory Referral/Consult to Physical Therapy  - Ambulatory referral/consult to Sports Medicine; Future    2. Hypertension, unspecified type  - hydroCHLOROthiazide 12.5 MG Tab; Take 1 tablet (12.5 mg total) by mouth once daily.  Dispense: 30 tablet; Refill: 11  - [facility] E-VISIT    3. Bilateral lower extremity edema L>R.  -HCTZ ordered to help with edema.   -Leg elevation recommended.       Plan:       Assessment & Plan    IMPRESSION:  Reviewed XR Right Knee: joint spaces maintained, bone structures intact, no joint effusion, small patella bony seen superiorly.  Persistent knee pain and swelling since last visit, with difficulty in mobility.  Swelling in affected leg, extending to foot, worse at end of day.  Diagnosed HTN based on elevated BP readings.  Started HCTZ 12.5 mg daily in the morning and will reassess in 2 weeks.  Recommend continued use of knee brace and ibuprofen twice daily for pain management.  Potential need for further evaluation by sports medicine, including possible MRI.     PLAN SUMMARY:  - Prescribed hydrochlorothiazide 12.5 mg daily for hypertension and peripheral edema  - Advised low sodium diet and avoiding fried foods  - Continue wearing knee brace and apply ice after work  - Recommend ibuprofen twice daily for pain management  - Ordered physical therapy for knee rehabilitation  - Referred to sports medicine for knee pain evaluation  - Schedule appointment with sports medicine  - E-visit follow up in 2 weeks to review blood pressure and assess ankle edema    ESSENTIAL HYPERTENSION:  - Monitored the patient's blood pressure, which is currently elevated at 146/90  today.  - Hypertension has likely been present since age 55 and can lead to cardiac complications including ventricular hypertrophy and reduced myocardial perfusion.  - Prescribed hydrochlorothiazide 12.5 mg daily in the morning to manage hypertension and peripheral edema.  - Explained its mechanism of action as a diuretic and its effects on blood pressure and fluid retention.  - Instructed the patient to monitor blood pressure 3 times daily: morning (before and after medication), and at night.  - Advised maintaining a low sodium diet, avoiding fried foods, and discussed how ibuprofen use may potentially elevate blood pressure.    KNEE PAIN AND INJURY:  - Patient to continue wearing knee brace for support and apply ice to knee after work (20 minutes on, 20 minutes off).  - Recommend ibuprofen twice daily for pain management.  - Ordered physical therapy for knee rehabilitation and referred to sports medicine for further evaluation of knee pain and potential treatments (e.g., injections, MRI).  - Patient instructed to schedule appointment with sports medicine at the  before leaving.    FOLLOW-UP:  - Scheduled an e-visit follow up in 2 weeks to review blood pressure readings and assess ankle edema status.       This note was generated with the assistance of ambient listening technology. Verbal consent was obtained by the patient and accompanying visitor(s) for the recording of patient appointment to facilitate this note. I attest to having reviewed and edited the generated note for accuracy, though some syntax or spelling errors may persist. Please contact the author of this note for any clarification.

## 2025-05-14 NOTE — PROGRESS NOTES
Subjective     Patient ID: Marina Caban is a 55 y.o. male.    Chief Complaint: Pain of the Left Knee (Pt here w/ c/o L) knee pain that has been ongoing since March. Pt denies any recent fall/injury. IBU PRN)    55-year-old man here today with complaints of left-sided knee pain.  First began in March of this year.  No known injury to the area.  Get not think of any past injury history to the knee.  Just started experiencing pain mainly in the medial joint line that was associated with significant swelling of the knee.  Was seen by his PCP for this issue in March.  X-rays were done that did not show any acute bony abnormalities or degenerative changes.  He has been taking ibuprofen as needed in his noted some relief in pain but swelling has been persistent.    Pain  Pertinent negatives include no chest pain, chills, congestion, coughing, headaches, rash or sore throat.       Review of Systems   Constitutional: Negative for chills and decreased appetite.   HENT:  Negative for congestion and sore throat.    Eyes:  Negative for blurred vision.   Cardiovascular:  Negative for chest pain, dyspnea on exertion and palpitations.   Respiratory:  Negative for cough and shortness of breath.    Skin:  Negative for rash.   Neurological:  Negative for difficulty with concentration, disturbances in coordination and headaches.   Psychiatric/Behavioral:  Negative for altered mental status, depression, hallucinations, memory loss and suicidal ideas.           Objective     General    Nursing note and vitals reviewed.  Constitutional: He is oriented to person, place, and time. He appears well-developed and well-nourished.   HENT:   Nose: Nose normal.   Eyes: EOM are normal. Pupils are equal, round, and reactive to light.   Neck: Neck supple.   Cardiovascular:  Normal rate.            Pulmonary/Chest: Effort normal.   Abdominal: Soft.   Neurological: He is alert and oriented to person, place, and time. He has normal reflexes.    Psychiatric: He has a normal mood and affect. His behavior is normal. Judgment and thought content normal.           Right Knee Exam   Right knee exam is normal.    Inspection   Effusion: absent    Range of Motion   Extension:  50   Flexion:  140     Tests   Meniscus   Jeffry:  Medial - negative Lateral - negative  Ligament Examination   Lachman: normal (-1 to 2mm)   PCL-Posterior Drawer: normal (0 to 2mm)     MCL - Valgus: normal (0 to 2mm)  LCL - Varus: normal  Patella   Patellar apprehension: negative  Passive Patellar Tilt: neutral  Patellar Tracking: normal    Other   Popliteal (Baker's) Cyst: absent  Sensation: normal    Left Knee Exam     Inspection   Effusion: present    Tenderness   The patient tender to palpation of the medial joint line.    Crepitus   The patient has crepitus of the patella and medial joint line.    Range of Motion   Extension:  50   Flexion:  140     Tests   Meniscus   Jeffry:  Medial - positive Lateral - negative  Stability   Lachman: normal (-1 to 2mm)   PCL-Posterior Drawer: normal (0 to 2mm)  MCL - Valgus: normal (0 to 2mm)  LCL - Varus: normal (0 to 2mm)  Patella   Patellar apprehension: negative  Passive Patellar Tilt: neutral  Patellar Tracking: normal    Other   Popliteal (Baker's) Cyst: present  Sensation: normal    Muscle Strength   Right Lower Extremity   Quadriceps:  5/5   Hamstrin/5   Left Lower Extremity   Quadriceps:  5/5   Hamstrin/5     Vascular Exam     Right Pulses  Dorsalis Pedis:      2+          Left Pulses  Dorsalis Pedis:      2+          Physical Exam  Vitals and nursing note reviewed.   Constitutional:       Appearance: He is well-developed and well-nourished.   HENT:      Nose: Nose normal.   Eyes:      Extraocular Movements: EOM normal.      Pupils: Pupils are equal, round, and reactive to light.   Cardiovascular:      Rate and Rhythm: Normal rate.      Pulses:           Dorsalis pedis pulses are 2+ on the right side and 2+ on the left side.  "  Pulmonary:      Effort: Pulmonary effort is normal.   Abdominal:      Palpations: Abdomen is soft.   Musculoskeletal:      Cervical back: Normal range of motion and neck supple.      Right knee: No effusion.      Instability Tests: Medial Jeffry test negative and lateral Jeffry test negative.      Left knee: Effusion present.      Instability Tests: Medial Jeffry test positive. Lateral Jeffry test negative.   Neurological:      Mental Status: He is alert and oriented to person, place, and time.      Deep Tendon Reflexes: Reflexes are normal and symmetric.   Psychiatric:         Mood and Affect: Mood and affect normal.         Behavior: Behavior normal.         Thought Content: Thought content normal.         Judgment: Judgment normal.           Assessment and Plan     Encounter Diagnoses   Name Primary?    Acute pain of left knee     Effusion, left knee Yes    Old tear of medial meniscus of left knee, unspecified tear type          Marina Arechiga" was seen today for pain.    Diagnoses and all orders for this visit:    Effusion, left knee    Acute pain of left knee  -     Ambulatory referral/consult to Sports Medicine    Old tear of medial meniscus of left knee, unspecified tear type    This has suspect he likely has an old meniscus tear that has become reinflated.  He has a significant effusion in his left knee today.  I offered to aspirate and inject his left knee under ultrasound guidance as a diagnostic and therapeutic procedure.  He agreed with this.  Tolerated well.  Recommend following up here as needed should condition return or fail to improve.    Large Joint Aspiration/Injection: L knee    Date/Time: 5/14/2025 2:20 PM    Performed by: Jed Borja MD  Authorized by: Jed Borja MD    Consent Done?:  Yes (Verbal)  Indications:  Arthritis and pain  Site marked: the procedure site was marked    Timeout: prior to procedure the correct patient, procedure, and site was verified    Prep: patient was " prepped and draped in usual sterile fashion      Local anesthesia used?: Yes    Local anesthetic:  Lidocaine 1% without epinephrine  Anesthetic total (ml):  4      Details:  Needle Size:  22 G and 18 G  Ultrasonic Guidance for needle placement?: Yes    Images are saved and documented.  Approach:  Lateral  Location:  Knee  Site:  L knee  Medications:  40 mg triamcinolone acetonide 40 mg/mL  Aspirate amount (mL):  68  Aspirate:  Serous, clear and yellow  Patient tolerance:  Patient tolerated the procedure well with no immediate complications     Ultrasound guidance was used to confirm proper needle placement.  Images of proper needle placement were saved and stored to the machine and uploaded to the patient's chart.

## 2025-05-15 ENCOUNTER — PATIENT OUTREACH (OUTPATIENT)
Dept: ADMINISTRATIVE | Facility: HOSPITAL | Age: 55
End: 2025-05-15
Payer: COMMERCIAL

## 2025-05-20 ENCOUNTER — CLINICAL SUPPORT (OUTPATIENT)
Dept: REHABILITATION | Facility: HOSPITAL | Age: 55
End: 2025-05-20
Payer: COMMERCIAL

## 2025-05-20 DIAGNOSIS — M25.662 IMPAIRED RANGE OF MOTION OF LEFT KNEE: Primary | ICD-10-CM

## 2025-05-20 PROCEDURE — 97161 PT EVAL LOW COMPLEX 20 MIN: CPT | Mod: PN

## 2025-05-20 PROCEDURE — 97112 NEUROMUSCULAR REEDUCATION: CPT | Mod: PN

## 2025-05-20 PROCEDURE — 97530 THERAPEUTIC ACTIVITIES: CPT | Mod: PN

## 2025-05-20 NOTE — PROGRESS NOTES
Outpatient Rehab    Physical Therapy Evaluation    Patient Name: Daniel Caban  MRN: [id number]  YOB: 1970  Encounter Date: 5/20/2025    Therapy Diagnosis:   Encounter Diagnosis   Name Primary?    Impaired range of motion of left knee Yes     Physician: Konstantin Park NP    Physician Orders: Eval and Treat  Medical Diagnosis: Acute pain of left knee    Visit # / Visits Authorized:  1 / 1  Insurance Authorization Period: 5/14/2025 to 5/14/2026  Date of Evaluation: 5/20/2025  Plan of Care Certification: 5/20/2025 to 8/15/25     Time In: 0900   Time Out: 0955  Total Time (in minutes): 55   Total Billable Time (in minutes): 55    Intake Outcome Measure for FOTO Survey    Therapist reviewed FOTO scores for Daniel Caban on 5/20/2025.   FOTO report - see Media section or FOTO account episode details.     Intake Score: 63%    Precautions:       Subjective   History of Present Illness  Daniel is a 55 y.o. male who reports to physical therapy with a chief concern of Left knee pain and swelling.     The patient reports a medical diagnosis of M25.562 (ICD-10-CM) - Acute pain of left knee.            History of Present Condition/Illness: Insidious onset of left knee pain, caused pain and swelling.  Pain is a little better.  Did have injection and knee drained. Going from sit to stand difficult, tight until walks a few steps.  Wearing OTC knee brace which is helping     Pain     Patient reports a current pain level of 6/10. Pain at best is reported as 5/10. Pain at worst is reported as 9/10.   Location: Left knee  Joint line  Clinical Progression (since onset): Stable  Pain Qualities: Aching, Sharp, Pressure         Treatment History  Treatments  Previously Received Treatments: Yes  Previous Treatments: Elevation, Rest, Other (Comment)  Other Previous Treatments: Injection and drained  Currently Receiving Treatments: No    Employment  Employment Status: Employed full-time   Walks, lift, pull, carry, push.  Having  difficulty due pain and swelling       Past Medical History/Physical Systems Review:   Marina Caban  has a past medical history of Hypertension.    Marina Caban  has a past surgical history that includes hernia rpair (2003) and Colonoscopy (N/A, 10/29/2024).    Marina has a current medication list which includes the following prescription(s): hydrochlorothiazide and ibuprofen.    Review of patient's allergies indicates:  No Known Allergies     Objective   Knee Observations  Left Knee Observations  Present: Edema and Effusion  Not Present: Straight Leg Raise Extensor Lag             Hip Range of Motion   Left Hip   Active (deg) Passive (deg) Pain   Flexion 100       Extension 10       ABduction 25       ADduction         External Rotation 90/90 30       External Rotation Prone         Internal Rotation 90/90 20       Internal Rotation Prone                  Knee Range of Motion   Left Knee   Active (deg) Passive (deg) Pain   Flexion 108       Extension 0                          Knee Strength   Right Strength Right Pain Left Strength Left  Pain   Flexion (S2) 5   4 Yes   Prone Flexion 5   4     Extension (L3) 5   4 Yes     Knee Extensor Lag  No Lag: Left       Ankle/Foot Strength - Planes of Motion   Right Strength Right Pain Left Strength Left  Pain   Dorsiflexion (L4) 5   5     Plantar Flexion (S1) 5   5     Inversion 5   5     Eversion 5   5     Great Toe Flexion 5   5     Great Toe Extension (L5) 5   5     Lesser Toes Flexion 5   5     Lesser Toes Extension 5   5            Knee Special Tests  Knee Ligament Tests  Negative: Left Anterior Drawer and Left Lachman  Negative: Left Valgus Stress at 0 Degrees, Left Varus Stress at 0 Degrees, Left Valgus Stress at 30 Degrees, and Left Varus Stress at 30 Degrees                     Treatment:  Therapeutic Exercise  TE 1: Nu Step  TE 2: GS Slant Board  Balance/Neuromuscular Re-Education  NMR 1: QS Towel Roll  NMR 2: SAQ 4#  NMR 3: SLR with Leg ER  NMR 4: Hip ADD  Ball Hip ABD BTB  NMR 5: Bridges  NMR 6: + Shuttle Bilateral ; Shuttle Single leg  NMR 7: + Stand TKE    Time Entry(in minutes):  PT Evaluation (Low) Time Entry: 30  Neuromuscular Re-Education Time Entry: 15  Therapeutic Activity Time Entry: 10    Assessment & Plan   Assessment  Daniel presents with a condition of Low complexity.   Presentation of Symptoms: Stable       Functional Limitations: Activity tolerance, Ambulating on uneven surfaces, Carrying objects, Completing self-care activities, Completing work/school activities, Decreased ambulation distance/endurance, Functional mobility, Gait limitations, Range of motion, Performing household chores, Participating in leisure activities, Painful locomotion/ambulation, Pain when reaching, Squatting, Standing tolerance  Impairments: Pain with functional activity, Impaired physical strength, Abnormal or restricted range of motion, Activity intolerance    Patient Goal for Therapy (PT): Less pain in knee with activity  Prognosis: Good  Assessment Details: Patient  is referred to outpatient Physical Therapy with a diagnosis of acute left knee pain.Patient  presents with clinical signs and symptoms that support this diagnosis with decreased knee and hip ROM, decreased hip girdle, quad, and hamstring Strength, patellar joint hypomobility, increased joint and soft tissue edema, and impaired functional mobility.The above impairments will be addressed through manual therapy techniques, therapeutic exercises, functional training, and modalities as necessary. Patient was treated and educated on exercises for home program, progression of therapy, and benefits of therapy to achieve full functional mobility. Patient will benefit from skilled physical therapy to meet short and long term goals and return to prior level of function.     Plan  From a physical therapy perspective, the patient would benefit from: Skilled Rehab Services    Planned therapy interventions include: Therapeutic  exercise, Therapeutic activities, Neuromuscular re-education, and Manual therapy.    Planned modalities to include: Cryotherapy (cold pack), Electrical stimulation - passive/unattended, and Thermotherapy (hot pack).        Visit Frequency: 2 times Per Week for 6 Weeks.       This plan was discussed with Patient.   Discussion participants: Agreed Upon Plan of Care             Patient's spiritual, cultural, and educational needs considered and patient agreeable to plan of care and goals.     Education  Education was done with Patient. The patient's learning style includes Demonstration and Listening. The patient Demonstrates understanding and Verbalizes understanding.                 Goals:   Active       Long Term        Patient will return to normal ADL, recreational, and work related activities with less pain and limitation.  (Progressing)       Start:  05/20/25    Expected End:  08/15/25            Patient to achieve full knee AROM   (Progressing)       Start:  05/20/25    Expected End:  08/15/25            Pain rating at best 1/10 to improve Quality of Life.  (Progressing)       Start:  05/20/25    Expected End:  08/15/25               Short term        Recent signs and systems trend is improving in order to progress towards LTG's.  (Progressing)       Start:  05/20/25    Expected End:  08/15/25            Patient will be independent with HEP in order to further progress and return to maximal function.  (Progressing)       Start:  05/20/25    Expected End:  08/15/25            Patient will be able to correct postural deviations in sitting and standing, to decrease pain and promote postural awareness for injury prevention.  (Progressing)       Start:  05/20/25    Expected End:  08/15/25                Paul Hanson, PT

## 2025-05-28 ENCOUNTER — E-VISIT (OUTPATIENT)
Dept: INTERNAL MEDICINE | Facility: CLINIC | Age: 55
End: 2025-05-28
Payer: COMMERCIAL

## 2025-05-28 DIAGNOSIS — I10 HYPERTENSION, UNSPECIFIED TYPE: Primary | ICD-10-CM

## 2025-06-04 VITALS — SYSTOLIC BLOOD PRESSURE: 158 MMHG | DIASTOLIC BLOOD PRESSURE: 97 MMHG

## 2025-06-04 RX ORDER — VALSARTAN 80 MG/1
80 TABLET ORAL DAILY
Qty: 30 TABLET | Refills: 0 | Status: SHIPPED | OUTPATIENT
Start: 2025-06-04

## 2025-06-18 ENCOUNTER — E-VISIT (OUTPATIENT)
Dept: INTERNAL MEDICINE | Facility: CLINIC | Age: 55
End: 2025-06-18
Payer: COMMERCIAL

## 2025-06-18 DIAGNOSIS — I10 HYPERTENSION, UNSPECIFIED TYPE: Primary | ICD-10-CM

## 2025-06-18 DIAGNOSIS — M25.579 ANKLE PAIN, UNSPECIFIED CHRONICITY, UNSPECIFIED LATERALITY: ICD-10-CM

## 2025-06-27 PROBLEM — M25.579 ANKLE PAIN: Status: ACTIVE | Noted: 2025-06-27

## 2025-06-27 RX ORDER — VALSARTAN 160 MG/1
160 TABLET ORAL DAILY
Qty: 90 TABLET | Refills: 3 | Status: SHIPPED | OUTPATIENT
Start: 2025-06-27 | End: 2026-06-27

## 2025-06-27 NOTE — PROGRESS NOTES
Patient ID: Marina Caban is a 55 y.o. male.        E-Visit Time Tracking:             Chief Complaint: Hypertension (Entered automatically based on patient selection in [employer].)      The patient initiated a request through [employer] on 6/18/2025 for evaluation and management with a chief complaint of Hypertension (Entered automatically based on patient selection in [employer].)     I evaluated the questionnaire submission on 06/27/2025.    Ohs Peq Evisit Hypertension    6/24/2025 10:26 AM CDT - Filed by Patient   Do you agree to participate in an E-Visit? Yes   If you have any of the following symptoms, please do not complete an E-Visit. Instead, schedule an appointment with your provider I acknowledge   Choose the state of your primary residence [place]   At least one blood pressure reading is required to proceed with the E-Visit.    Please enter your most recent blood pressure reading.   Systolic Blood Pressure 150   Diastolic Blood Pressure 138   Reading 1 150/101   Reading 2 150/97   Reading 3 155/96   Reading 4 148/100   What is the main issue you would like addressed today? Ankle killing  me swollen   What would you like addressed about your blood pressure? New concern   Your blood pressure is a: Recurring problem   When were you first diagnosed with high blood pressure? Less than 1 month ago   Since you first learned you had high blood pressure, how has it changed? Gradually improving   How would you classify your blood pressure? Well controlled   Are you having any of the following symptoms from your high blood pressure? None of the above   Are you taking any of the following medications? None of these   The following factors can make high blood pressure worse or harder to control. Which of them might be contributing to your high blood pressure?  Stress;  Lack of exercise;  Medication side effects   Have you taken blood pressure medications in the past that caused you problems or side effects? I don't know    Are you currently taking medication(s) for your blood pressure? Yes   Have you recently started a new medication or changed your dose? No   How often are you taking your medication per week?  Every day   Have you had any side effects from your current blood pressure medication? No   If you are able to take your pulse, please provide it below. 145/90   Provide any information you feel is important to your history not asked above Ankle swelling   Please attach any relevant images or files    Are you able to take any other vitals? No         No diagnosis found.     No orders of the defined types were placed in this encounter.           No follow-ups on file.

## 2025-06-30 ENCOUNTER — HOSPITAL ENCOUNTER (OUTPATIENT)
Dept: RADIOLOGY | Facility: HOSPITAL | Age: 55
Discharge: HOME | End: 2025-06-30
Attending: NURSE PRACTITIONER
Payer: COMMERCIAL

## 2025-06-30 ENCOUNTER — OFFICE VISIT (OUTPATIENT)
Dept: INTERNAL MEDICINE | Facility: CLINIC | Age: 55
End: 2025-06-30
Payer: COMMERCIAL

## 2025-06-30 VITALS
WEIGHT: 208.56 LBS | SYSTOLIC BLOOD PRESSURE: 126 MMHG | TEMPERATURE: 97 F | DIASTOLIC BLOOD PRESSURE: 80 MMHG | RESPIRATION RATE: 17 BRPM | OXYGEN SATURATION: 97 % | BODY MASS INDEX: 28.25 KG/M2 | HEIGHT: 72 IN | HEART RATE: 71 BPM

## 2025-06-30 DIAGNOSIS — M21.42 PES PLANUS OF BOTH FEET: ICD-10-CM

## 2025-06-30 DIAGNOSIS — M25.572 ACUTE LEFT ANKLE PAIN: ICD-10-CM

## 2025-06-30 DIAGNOSIS — M79.672 LEFT FOOT PAIN: ICD-10-CM

## 2025-06-30 DIAGNOSIS — M25.572 ACUTE LEFT ANKLE PAIN: Primary | ICD-10-CM

## 2025-06-30 DIAGNOSIS — I10 HYPERTENSION, UNSPECIFIED TYPE: ICD-10-CM

## 2025-06-30 DIAGNOSIS — M21.41 PES PLANUS OF BOTH FEET: ICD-10-CM

## 2025-06-30 PROCEDURE — 73630 X-RAY EXAM OF FOOT: CPT | Mod: 26,LT,, | Performed by: RADIOLOGY

## 2025-06-30 PROCEDURE — 73610 X-RAY EXAM OF ANKLE: CPT | Mod: TC,PO,LT

## 2025-06-30 PROCEDURE — 73610 X-RAY EXAM OF ANKLE: CPT | Mod: 26,LT,, | Performed by: RADIOLOGY

## 2025-06-30 PROCEDURE — 73630 X-RAY EXAM OF FOOT: CPT | Mod: TC,PO,LT

## 2025-06-30 PROCEDURE — 99999 PR PBB SHADOW E&M-EST. PATIENT-LVL V: CPT | Mod: PBBFAC,,, | Performed by: NURSE PRACTITIONER

## 2025-06-30 NOTE — PROGRESS NOTES
Subjective:       Patient ID: Marina Caban is a 55 y.o. male.    Chief Complaint: Ankle Pain    History of Present Illness    CHIEF COMPLAINT:  Patient presents today for left ankle/foot pain.    HISTORY OF PRESENT ILLNESS:  He reports left ankle pain that began shortly after his knee pain resolved. Pain is characterized by pressure and discomfort with standing, notably worse in the morning when first getting ready. Pain improves with rest and stretching. He denies any recent trauma or injury to the ankle. Walking exacerbates the pain, and he avoids weight-bearing activities. He denies ankle instability or buckling sensation.    MEDICAL HISTORY:  He has a history of plantar fasciitis, previously treated with physical therapy. He has been diagnosed with flat feet.    MEDICATIONS:  He takes ibuprofen 200mg daily for pain management with reported benefit.    ROS:  Musculoskeletal: +joint pain, +limb pain, +pain with movement       Objective:      Physical Exam  Vitals reviewed.   Constitutional:       Appearance: Normal appearance.   HENT:      Head: Normocephalic and atraumatic.      Nose: Nose normal.      Mouth/Throat:      Mouth: Mucous membranes are moist.   Eyes:      Pupils: Pupils are equal, round, and reactive to light.   Cardiovascular:      Rate and Rhythm: Normal rate and regular rhythm.      Heart sounds: Normal heart sounds.   Pulmonary:      Effort: Pulmonary effort is normal.      Breath sounds: Normal breath sounds.   Abdominal:      General: Abdomen is flat. Bowel sounds are normal.      Palpations: Abdomen is soft.   Musculoskeletal:         General: Normal range of motion.      Cervical back: Normal range of motion.      Right foot: Deformity (Pes Planus) present.      Left foot: Deformity (Pes Planus) present.        Feet:    Feet:      Right foot:      Skin integrity: Skin integrity normal.      Toenail Condition: Right toenails are normal.      Left foot:      Skin integrity: Skin integrity  normal.      Toenail Condition: Left toenails are normal.      Comments: Pain with palpation in highlighted region  Skin:     General: Skin is warm and dry.   Neurological:      General: No focal deficit present.      Mental Status: He is alert and oriented to person, place, and time.   Psychiatric:         Mood and Affect: Mood normal.         Behavior: Behavior normal.         Assessment:       1. Acute left ankle pain  - Ambulatory referral/consult to Podiatry; Future  - X-Ray Foot Complete Left; Future  - X-Ray Ankle Complete Left; Future    2. Left foot pain  - Ambulatory referral/consult to Podiatry; Future  - X-Ray Foot Complete Left; Future    3. Pes planus of both feet  - Ambulatory referral/consult to Podiatry; Future  - X-Ray Foot Complete Left; Future    4. Hypertension, unspecified type  -Well controlled.   -Continue with current dose of Valsartan 160 mg daily.       Plan:       Assessment & Plan    IMPRESSION:  Assessed ankle pain, noting flat feet and previous history of plantar fasciitis.  Considered possibility of arthritis as cause of ankle pain in absence of trauma.  Determined XR necessary to establish baseline and check for arthritis, explained it would not likely show fracture due to absence of trauma.    PLAN SUMMARY:  - Recommend arch support insoles for both feet  - Continue rolling out foot as previously done  - Increased ibuprofen dosage to 800 mg (4 tablets) 3 times daily as needed, 8-hour intervals  - Ordered x-ray of left foot and ankle  - Referred patient to podiatrist for evaluation and potential steroid injections    PAIN IN LEFT ANKLE AND JOINTS OF LEFT FOOT:  - Patient reports significant left ankle pain, especially in the morning, with inability to bear weight.  - Examination revealed specific pain points without swelling or instability.  - No trauma reported, with possible arthritis as a cause.  - Ordered x-ray of left foot and ankle to establish baseline and check for  arthritis.  - Increased ibuprofen dosage to 800 mg (4 200 mg tablets) 3 times daily as needed for pain, with 8-hour intervals between doses, clarifying it is not addictive.  - Referred patient to podiatrist for further evaluation .    FLAT FOOT (PES PLANUS):  - Physical exam confirmed flat feet with no current use of insoles.  - Recommend arch support insoles from [employer] for both feet to potentially alleviate pain.    PERSONAL HISTORY OF PLANTAR FASCIITIS:  - Patient has history of plantar fasciitis previously treated with therapy.  - Recommend continuing to roll out foot as previously done, which has shown improvement in current symptoms.     Follow up PRN.     This note was generated with the assistance of ambient listening technology. Verbal consent was obtained by the patient and accompanying visitor(s) for the recording of patient appointment to facilitate this note. I attest to having reviewed and edited the generated note for accuracy, though some syntax or spelling errors may persist. Please contact the author of this note for any clarification.

## 2025-07-01 ENCOUNTER — RESULTS FOLLOW-UP (OUTPATIENT)
Dept: INTERNAL MEDICINE | Facility: CLINIC | Age: 55
End: 2025-07-01

## 2025-07-10 RX ORDER — IBUPROFEN 800 MG/1
800 TABLET, FILM COATED ORAL 3 TIMES DAILY PRN
Qty: 90 TABLET | Refills: 0 | Status: SHIPPED | OUTPATIENT
Start: 2025-07-10

## 2025-07-10 NOTE — TELEPHONE ENCOUNTER
Refill Routing Note   Medication(s) are not appropriate for processing by [facility] for the following reason(s):        Outside of protocol  Non-participating provider    [facility] action(s):  Route               Appointments  past 12m or future 3m with PCP    Date Provider   Last Visit   6/30/2025 Konstantin Park NP   Next Visit   Visit date not found Konstantin Park NP   ED visits in past 90 days: 0        Note composed:6:50 AM 07/10/2025

## 2025-07-15 ENCOUNTER — OFFICE VISIT (OUTPATIENT)
Dept: PODIATRY | Facility: CLINIC | Age: 55
End: 2025-07-15
Payer: COMMERCIAL

## 2025-07-15 VITALS — OXYGEN SATURATION: 98 % | HEART RATE: 73 BPM | WEIGHT: 203.69 LBS | BODY MASS INDEX: 27.59 KG/M2 | HEIGHT: 72 IN

## 2025-07-15 DIAGNOSIS — M25.572 ACUTE LEFT ANKLE PAIN: ICD-10-CM

## 2025-07-15 DIAGNOSIS — M79.672 LEFT FOOT PAIN: ICD-10-CM

## 2025-07-15 DIAGNOSIS — M89.9 OSTEOCHONDRAL LESION OF TALAR DOME: ICD-10-CM

## 2025-07-15 DIAGNOSIS — M94.9 OSTEOCHONDRAL LESION OF TALAR DOME: ICD-10-CM

## 2025-07-15 DIAGNOSIS — M21.41 PES PLANUS OF BOTH FEET: ICD-10-CM

## 2025-07-15 DIAGNOSIS — M21.42 PES PLANUS OF BOTH FEET: ICD-10-CM

## 2025-07-15 DIAGNOSIS — M19.072 ARTHRITIS OF LEFT ANKLE: Primary | ICD-10-CM

## 2025-07-15 PROCEDURE — 4010F ACE/ARB THERAPY RXD/TAKEN: CPT | Mod: CPTII,S$GLB,,

## 2025-07-15 PROCEDURE — 1160F RVW MEDS BY RX/DR IN RCRD: CPT | Mod: CPTII,S$GLB,,

## 2025-07-15 PROCEDURE — 99204 OFFICE O/P NEW MOD 45 MIN: CPT | Mod: S$GLB,,,

## 2025-07-15 PROCEDURE — 3008F BODY MASS INDEX DOCD: CPT | Mod: CPTII,S$GLB,,

## 2025-07-15 PROCEDURE — 99999 PR PBB SHADOW E&M-EST. PATIENT-LVL III: CPT | Mod: PBBFAC,,,

## 2025-07-15 PROCEDURE — 1159F MED LIST DOCD IN RCRD: CPT | Mod: CPTII,S$GLB,,

## 2025-07-15 RX ORDER — METHYLPREDNISOLONE 4 MG/1
TABLET ORAL
Qty: 21 EACH | Refills: 0 | Status: SHIPPED | OUTPATIENT
Start: 2025-07-15 | End: 2025-08-05

## 2025-07-15 RX ORDER — MELOXICAM 15 MG/1
15 TABLET ORAL DAILY
Qty: 30 TABLET | Refills: 1 | Status: SHIPPED | OUTPATIENT
Start: 2025-07-15

## 2025-07-15 NOTE — PROGRESS NOTES
[address] [facility] [address]  [place] [address]  Phone: [phone]   [phone]    Patient's PCP:Phuc Goodman DO  Referring Provider: Konstantin Park    Subjective:      Chief Complaint:: Ankle Injury (Left ankle pain, medial worse)    Ankle Injury   Pertinent negatives include no numbness.     Marina Caban is a 55 y.o. male who presents today with a complaint of Left ankle pain, medial worse. The current episode started about 2 months.  The symptoms include aching and tenderness. Probable cause of complaint unknown, denies injury.  The symptoms are aggravated by pressure. The problem has stayed the same. Treatment to date have included elevation and otc pain meds which provided some relief. Took a week off work to see if it would help, did not. Works at [employer], . Relays 10/10 pain with every step.       Vitals:    07/15/25 1018   Pulse: 73   SpO2: 98%   Weight: 92.4 kg (203 lb 11.3 oz)   Height: 6' (1.829 m)   PainSc: 10-Worst pain ever      Shoe Size: 13      Past Surgical History:   Procedure Laterality Date    COLONOSCOPY N/A 10/29/2024    Procedure: COLONOSCOPY;  Surgeon: Amna Mccloud MD;  Location: [facility];  Service: Endoscopy;  Laterality: N/A;  Referral Phuc Goodman. Suprep instr. to portal.[facility]  10/23/24- pc complete. DBM    hernia rpair  2003     Past Medical History:   Diagnosis Date    Hypertension 5/14/2025     Family History   Problem Relation Name Age of Onset    Heart disease Mother      Peripheral vascular disease Mother      COPD Father      Emphysema Father      Heart disease Father          chf    Heart disease Brother      Stroke Paternal Grandfather      Cancer Neg Hx          Social History:   Marital Status:   Alcohol History:  reports current alcohol use.  Tobacco History:  reports that he has never smoked. He has never used smokeless tobacco.  Drug History:  reports no history of drug use.    Review of patient's allergies  indicates:  No Known Allergies    Current Medications[1]    Review of Systems   Constitutional:  Negative for chills, fatigue, fever and unexpected weight change.   HENT:  Negative for hearing loss and trouble swallowing.    Eyes:  Negative for photophobia and visual disturbance.   Respiratory:  Negative for cough, shortness of breath and wheezing.    Cardiovascular:  Negative for chest pain, palpitations and leg swelling.   Gastrointestinal:  Negative for abdominal pain and nausea.   Genitourinary:  Negative for dysuria and frequency.   Musculoskeletal:  Negative for arthralgias, back pain, gait problem, joint swelling, myalgias and neck pain.   Skin:  Negative for rash and wound.   Neurological:  Negative for tremors, seizures, weakness, numbness and headaches.   Hematological:  Does not bruise/bleed easily.         Objective:        Physical Exam:   Foot Exam    General  General Appearance: appears stated age and healthy   Orientation: alert and oriented to person, place, and time   Affect: appropriate   Gait: antalgic       Left Foot/Ankle      Inspection and Palpation  Ecchymosis: none  Tenderness: ankle (Ankle joint circumferentially 10/10 pain with palpation)  Swelling: ankle   Arch: pes planus  Hammertoes: absent  Claw toes: absent  Hallux valgus: yes  Hallux limitus: no  Fungus Toenails: not present    Neurovascular  Dorsalis pedis: 2+  Posterior tibial: 2+  Capillary refill: 3+  Saphenous nerve sensation: normal  Tibial nerve sensation: normal  Superficial peroneal nerve sensation: normal  Deep peroneal nerve sensation: normal  Sural nerve sensation: normal  Achilles reflex: 2+  Babinski reflex: 2+    Muscle Strength  Ankle dorsiflexion: 5  Ankle plantar flexion: 5  Ankle inversion: 5  Ankle eversion: 5  Great toe extension: 5  Great toe flexion: 5    Range of Motion    Passive  Ankle dorsiflexion: 5, pain  Plantar flexion: pain  Ankle eversion: pain  Ankle inversion: pain      Tests  Anterior drawer:  negative   Varus Tilt: negative   Syndesmosis squeeze test: negative   Calcaneal squeeze: negative   Talar tilt: negative         Imaging:    XR Results:  Results for orders placed during the hospital encounter of 06/30/25    X-Ray Foot Complete Left    Narrative  EXAMINATION:  XR FOOT COMPLETE 3 VIEW LEFT    CLINICAL HISTORY:  .  Pain in left ankle and joints of left foot    TECHNIQUE:  AP, lateral and oblique views of the left foot were performed.    COMPARISON:  None    FINDINGS:  No fracture or dislocation.  No bone destruction identified    Impression  See above      Electronically signed by: Arsh Moreira MD  Date:    06/30/2025  Time:    09:33      Narrative & Impression  EXAMINATION:  XR ANKLE COMPLETE 3 VIEW LEFT     CLINICAL HISTORY:  Pain in left ankle and joints of left foot     TECHNIQUE:  AP, lateral and oblique views of the left ankle were performed.     COMPARISON:  None     FINDINGS:  No fracture or dislocation.  No bone destruction identified.  Small bony spur seen arising from the plantar aspect of the calcaneus.     Impression:     See above        Electronically signed by:Arsh Moreira MD  Date:                                            06/30/2025  Time:                                           09:34         Assessment:       1. Arthritis of left ankle    2. Osteochondral lesion of talar dome    3. Pes planus of both feet    4. Acute left ankle pain    5. Left foot pain      Plan:   Arthritis of left ankle  -     MRI Hindfoot WO Contrast LT; Future; Expected date: 07/15/2025    Osteochondral lesion of talar dome  -     MRI Hindfoot WO Contrast LT; Future; Expected date: 07/15/2025    Pes planus of both feet  -     Ambulatory referral/consult to Podiatry    Acute left ankle pain  -     Ambulatory referral/consult to Podiatry  -     MRI Hindfoot WO Contrast LT; Future; Expected date: 07/15/2025  -     IMMOBILIZER FOR HOME USE    Left foot pain  -     Ambulatory referral/consult to  Podiatry    Other orders  -     methylPREDNISolone (MEDROL DOSEPACK) 4 mg tablet; use as directed  Dispense: 21 each; Refill: 0  -     meloxicam (MOBIC) 15 MG tablet; Take 1 tablet (15 mg total) by mouth once daily.  Dispense: 30 tablet; Refill: 1      Personally reviewed X-ray left Foot and Ankle, Three Views, and radiologists report. Discussed findings with patient including Ankle OA, possible OCD lesion on medial and lateral should of the talus, pes planus, mild HAV.     Ankle brace dispensed     Medrol dose pack 1 week     Follow Medrol dose pack with Meloxicam once daily prn     MRI Left Ankle Hindfoot without contrast, assess for condition of AJ, Talar Dome (OCD lesion), and Ankle / Subtalar joint OA  Follow up after preforming MRI or sooner prn     Counseling:     I provided patient education verbally regarding: Patient diagnosis, treatment options, as well as alternatives, risks, and benefits.     This note was created using Dragon voice recognition software that occasionally misinterpreted phrases or words.     Pawan Christopher DPM  Podiatry / Foot and Ankle Surgery   Secure chat preferred          [1]   Current Outpatient Medications   Medication Sig Dispense Refill    hydroCHLOROthiazide 12.5 MG Tab Take 1 tablet (12.5 mg total) by mouth once daily. (Patient not taking: Reported on 7/15/2025) 30 tablet 11    ibuprofen (ADVIL,MOTRIN) 800 MG tablet Take 1 tablet (800 mg total) by mouth 3 (three) times daily as needed for Pain. 90 tablet 0    meloxicam (MOBIC) 15 MG tablet Take 1 tablet (15 mg total) by mouth once daily. 30 tablet 1    methylPREDNISolone (MEDROL DOSEPACK) 4 mg tablet use as directed 21 each 0    valsartan (DIOVAN) 160 MG tablet Take 1 tablet (160 mg total) by mouth once daily. 90 tablet 3     No current facility-administered medications for this visit.

## 2025-07-22 ENCOUNTER — HOSPITAL ENCOUNTER (OUTPATIENT)
Dept: RADIOLOGY | Facility: HOSPITAL | Age: 55
Discharge: HOME | End: 2025-07-22
Payer: COMMERCIAL

## 2025-07-22 DIAGNOSIS — M89.9 OSTEOCHONDRAL LESION OF TALAR DOME: ICD-10-CM

## 2025-07-22 DIAGNOSIS — M94.9 OSTEOCHONDRAL LESION OF TALAR DOME: ICD-10-CM

## 2025-07-22 DIAGNOSIS — M19.072 ARTHRITIS OF LEFT ANKLE: ICD-10-CM

## 2025-07-22 DIAGNOSIS — M25.572 ACUTE LEFT ANKLE PAIN: ICD-10-CM

## 2025-07-22 PROCEDURE — 73718 MRI LOWER EXTREMITY W/O DYE: CPT | Mod: TC,PO,LT

## 2025-07-22 PROCEDURE — 73718 MRI LOWER EXTREMITY W/O DYE: CPT | Mod: 26,LT,, | Performed by: RADIOLOGY
